# Patient Record
Sex: MALE | Race: WHITE | NOT HISPANIC OR LATINO | Employment: STUDENT | URBAN - METROPOLITAN AREA
[De-identification: names, ages, dates, MRNs, and addresses within clinical notes are randomized per-mention and may not be internally consistent; named-entity substitution may affect disease eponyms.]

---
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
## 2021-04-06 ENCOUNTER — IMMUNIZATION (OUTPATIENT)
Dept: PRIMARY CARE CLINIC | Facility: CLINIC | Age: 21
End: 2021-04-06
Payer: MEDICAID

## 2021-04-06 DIAGNOSIS — Z23 NEED FOR VACCINATION: Primary | ICD-10-CM

## 2021-04-06 PROCEDURE — 0001A COVID-19, MRNA, LNP-S, PF, 30 MCG/0.3 ML DOSE VACCINE: CPT | Mod: CV19,S$GLB,, | Performed by: FAMILY MEDICINE

## 2021-04-06 PROCEDURE — 91300 COVID-19, MRNA, LNP-S, PF, 30 MCG/0.3 ML DOSE VACCINE: CPT | Mod: S$GLB,,, | Performed by: FAMILY MEDICINE

## 2021-04-06 PROCEDURE — 91300 COVID-19, MRNA, LNP-S, PF, 30 MCG/0.3 ML DOSE VACCINE: ICD-10-PCS | Mod: S$GLB,,, | Performed by: FAMILY MEDICINE

## 2021-04-06 PROCEDURE — 0001A COVID-19, MRNA, LNP-S, PF, 30 MCG/0.3 ML DOSE VACCINE: ICD-10-PCS | Mod: CV19,S$GLB,, | Performed by: FAMILY MEDICINE

## 2021-04-27 ENCOUNTER — IMMUNIZATION (OUTPATIENT)
Dept: PRIMARY CARE CLINIC | Facility: CLINIC | Age: 21
End: 2021-04-27
Payer: MEDICAID

## 2021-04-27 DIAGNOSIS — Z23 NEED FOR VACCINATION: Primary | ICD-10-CM

## 2021-04-27 PROCEDURE — 91300 COVID-19, MRNA, LNP-S, PF, 30 MCG/0.3 ML DOSE VACCINE: CPT | Mod: S$GLB,,, | Performed by: FAMILY MEDICINE

## 2021-04-27 PROCEDURE — 0002A COVID-19, MRNA, LNP-S, PF, 30 MCG/0.3 ML DOSE VACCINE: ICD-10-PCS | Mod: CV19,S$GLB,, | Performed by: FAMILY MEDICINE

## 2021-04-27 PROCEDURE — 91300 COVID-19, MRNA, LNP-S, PF, 30 MCG/0.3 ML DOSE VACCINE: ICD-10-PCS | Mod: S$GLB,,, | Performed by: FAMILY MEDICINE

## 2021-04-27 PROCEDURE — 0002A COVID-19, MRNA, LNP-S, PF, 30 MCG/0.3 ML DOSE VACCINE: CPT | Mod: CV19,S$GLB,, | Performed by: FAMILY MEDICINE

## 2021-07-15 ENCOUNTER — PATIENT MESSAGE (OUTPATIENT)
Dept: PRIMARY CARE CLINIC | Facility: CLINIC | Age: 21
End: 2021-07-15

## 2021-07-15 DIAGNOSIS — L60.0 INGROWN TOENAIL: Primary | ICD-10-CM

## 2021-07-22 ENCOUNTER — OFFICE VISIT (OUTPATIENT)
Dept: PODIATRY | Facility: CLINIC | Age: 21
End: 2021-07-22
Payer: MEDICAID

## 2021-07-22 VITALS — HEIGHT: 69 IN | WEIGHT: 224 LBS | OXYGEN SATURATION: 98 % | HEART RATE: 92 BPM | BODY MASS INDEX: 33.18 KG/M2

## 2021-07-22 DIAGNOSIS — M79.675 TOE PAIN, LEFT: ICD-10-CM

## 2021-07-22 DIAGNOSIS — L60.0 INGROWN TOENAIL: ICD-10-CM

## 2021-07-22 DIAGNOSIS — L60.0 INGROWN NAIL: Primary | ICD-10-CM

## 2021-07-22 PROCEDURE — 11750 EXCISION NAIL&NAIL MATRIX: CPT | Mod: TA,S$GLB,, | Performed by: PODIATRIST

## 2021-07-22 PROCEDURE — 99203 PR OFFICE/OUTPT VISIT, NEW, LEVL III, 30-44 MIN: ICD-10-PCS | Mod: 25,S$GLB,, | Performed by: PODIATRIST

## 2021-07-22 PROCEDURE — 11750 NAIL REMOVAL: ICD-10-PCS | Mod: TA,S$GLB,, | Performed by: PODIATRIST

## 2021-07-22 PROCEDURE — 99203 OFFICE O/P NEW LOW 30 MIN: CPT | Mod: 25,S$GLB,, | Performed by: PODIATRIST

## 2021-09-17 DIAGNOSIS — J45.20 MILD INTERMITTENT REACTIVE AIRWAY DISEASE WITHOUT COMPLICATION: ICD-10-CM

## 2021-09-17 DIAGNOSIS — K52.9 GASTROENTERITIS: ICD-10-CM

## 2021-09-17 RX ORDER — OMEPRAZOLE 40 MG/1
40 CAPSULE, DELAYED RELEASE ORAL DAILY
Qty: 30 CAPSULE | Refills: 1 | Status: SHIPPED | OUTPATIENT
Start: 2021-09-17 | End: 2021-11-17 | Stop reason: SDUPTHER

## 2021-09-17 RX ORDER — ALBUTEROL SULFATE 90 UG/1
2 AEROSOL, METERED RESPIRATORY (INHALATION) EVERY 4 HOURS PRN
Qty: 18 G | Refills: 1 | Status: SHIPPED | OUTPATIENT
Start: 2021-09-17 | End: 2022-09-12 | Stop reason: SDUPTHER

## 2021-09-27 ENCOUNTER — PATIENT MESSAGE (OUTPATIENT)
Dept: PRIMARY CARE CLINIC | Facility: CLINIC | Age: 21
End: 2021-09-27

## 2021-09-28 RX ORDER — CETIRIZINE HYDROCHLORIDE 10 MG/1
TABLET ORAL
Qty: 30 TABLET | Refills: 5 | Status: SHIPPED | OUTPATIENT
Start: 2021-09-28 | End: 2021-11-17 | Stop reason: SDUPTHER

## 2021-09-28 RX ORDER — FLUTICASONE PROPIONATE 50 MCG
SPRAY, SUSPENSION (ML) NASAL
Qty: 16 G | Refills: 5 | Status: SHIPPED | OUTPATIENT
Start: 2021-09-28 | End: 2022-02-10 | Stop reason: SDUPTHER

## 2021-11-17 DIAGNOSIS — K52.9 GASTROENTERITIS: ICD-10-CM

## 2021-11-17 RX ORDER — CETIRIZINE HYDROCHLORIDE 10 MG/1
TABLET ORAL
Qty: 30 TABLET | Refills: 0 | Status: SHIPPED | OUTPATIENT
Start: 2021-11-17 | End: 2022-02-10 | Stop reason: SDUPTHER

## 2021-11-17 RX ORDER — OMEPRAZOLE 40 MG/1
40 CAPSULE, DELAYED RELEASE ORAL DAILY
Qty: 30 CAPSULE | Refills: 0 | Status: SHIPPED | OUTPATIENT
Start: 2021-11-17 | End: 2022-02-10 | Stop reason: SDUPTHER

## 2021-12-09 DIAGNOSIS — K52.9 GASTROENTERITIS: ICD-10-CM

## 2021-12-10 RX ORDER — OMEPRAZOLE 40 MG/1
CAPSULE, DELAYED RELEASE ORAL
Qty: 30 CAPSULE | Refills: 0 | OUTPATIENT
Start: 2021-12-10

## 2021-12-30 DIAGNOSIS — K52.9 GASTROENTERITIS: ICD-10-CM

## 2021-12-30 NOTE — TELEPHONE ENCOUNTER
----- Message from Brenda Benedict sent at 12/30/2021  4:45 PM CST -----  Contact: [phone] [employer]  Requesting an RX refill or new RX.  Is this a refill or new RX: refill  RX name and strength (copy/paste from chart): omeprazole (PRILOSEC) 40 MG capsule  Is this a 30 day or 90 day RX:   Patient advised that in the future they can use their [facility] account to request a refill?:  n/a  Pharmacy name and phone # (copy/paste from chart):  [email or web address] [place] - [address]  [address] [place] [address]  Phone: [phone] Fax: [phone]  Hours: Not open 24 hours      Comments:

## 2021-12-31 ENCOUNTER — PATIENT MESSAGE (OUTPATIENT)
Dept: PRIMARY CARE CLINIC | Facility: CLINIC | Age: 21
End: 2021-12-31
Payer: MEDICAID

## 2021-12-31 RX ORDER — OMEPRAZOLE 40 MG/1
40 CAPSULE, DELAYED RELEASE ORAL DAILY
Qty: 30 CAPSULE | Refills: 0 | OUTPATIENT
Start: 2021-12-31 | End: 2022-12-31

## 2022-02-10 ENCOUNTER — OFFICE VISIT (OUTPATIENT)
Dept: PRIMARY CARE CLINIC | Facility: CLINIC | Age: 22
End: 2022-02-10
Payer: MEDICAID

## 2022-02-10 VITALS
HEART RATE: 100 BPM | BODY MASS INDEX: 34.45 KG/M2 | OXYGEN SATURATION: 99 % | WEIGHT: 227.31 LBS | DIASTOLIC BLOOD PRESSURE: 92 MMHG | HEIGHT: 68 IN | SYSTOLIC BLOOD PRESSURE: 130 MMHG | RESPIRATION RATE: 16 BRPM

## 2022-02-10 DIAGNOSIS — Z11.59 NEED FOR HEPATITIS C SCREENING TEST: ICD-10-CM

## 2022-02-10 DIAGNOSIS — Z13.6 ENCOUNTER FOR LIPID SCREENING FOR CARDIOVASCULAR DISEASE: ICD-10-CM

## 2022-02-10 DIAGNOSIS — J01.90 ACUTE NON-RECURRENT SINUSITIS, UNSPECIFIED LOCATION: ICD-10-CM

## 2022-02-10 DIAGNOSIS — F90.9 ATTENTION DEFICIT HYPERACTIVITY DISORDER (ADHD), UNSPECIFIED ADHD TYPE: Primary | ICD-10-CM

## 2022-02-10 DIAGNOSIS — Z13.220 ENCOUNTER FOR LIPID SCREENING FOR CARDIOVASCULAR DISEASE: ICD-10-CM

## 2022-02-10 DIAGNOSIS — Z23 NEED FOR VACCINATION: ICD-10-CM

## 2022-02-10 DIAGNOSIS — J30.89 NON-SEASONAL ALLERGIC RHINITIS, UNSPECIFIED TRIGGER: ICD-10-CM

## 2022-02-10 PROCEDURE — 99214 OFFICE O/P EST MOD 30 MIN: CPT | Mod: PBBFAC,PN | Performed by: INTERNAL MEDICINE

## 2022-02-10 PROCEDURE — 1159F PR MEDICATION LIST DOCUMENTED IN MEDICAL RECORD: ICD-10-PCS | Mod: CPTII,,, | Performed by: INTERNAL MEDICINE

## 2022-02-10 PROCEDURE — 99214 PR OFFICE/OUTPT VISIT, EST, LEVL IV, 30-39 MIN: ICD-10-PCS | Mod: S$PBB,,, | Performed by: INTERNAL MEDICINE

## 2022-02-10 PROCEDURE — 3080F PR MOST RECENT DIASTOLIC BLOOD PRESSURE >= 90 MM HG: ICD-10-PCS | Mod: CPTII,,, | Performed by: INTERNAL MEDICINE

## 2022-02-10 PROCEDURE — 3080F DIAST BP >= 90 MM HG: CPT | Mod: CPTII,,, | Performed by: INTERNAL MEDICINE

## 2022-02-10 PROCEDURE — 90471 IMMUNIZATION ADMIN: CPT | Mod: PBBFAC,PN

## 2022-02-10 PROCEDURE — 1160F RVW MEDS BY RX/DR IN RCRD: CPT | Mod: CPTII,,, | Performed by: INTERNAL MEDICINE

## 2022-02-10 PROCEDURE — 99999 PR PBB SHADOW E&M-EST. PATIENT-LVL IV: CPT | Mod: PBBFAC,,, | Performed by: INTERNAL MEDICINE

## 2022-02-10 PROCEDURE — 3008F PR BODY MASS INDEX (BMI) DOCUMENTED: ICD-10-PCS | Mod: CPTII,,, | Performed by: INTERNAL MEDICINE

## 2022-02-10 PROCEDURE — 3008F BODY MASS INDEX DOCD: CPT | Mod: CPTII,,, | Performed by: INTERNAL MEDICINE

## 2022-02-10 PROCEDURE — 99999 PR PBB SHADOW E&M-EST. PATIENT-LVL IV: ICD-10-PCS | Mod: PBBFAC,,, | Performed by: INTERNAL MEDICINE

## 2022-02-10 PROCEDURE — 99214 OFFICE O/P EST MOD 30 MIN: CPT | Mod: S$PBB,,, | Performed by: INTERNAL MEDICINE

## 2022-02-10 PROCEDURE — 1159F MED LIST DOCD IN RCRD: CPT | Mod: CPTII,,, | Performed by: INTERNAL MEDICINE

## 2022-02-10 PROCEDURE — 3075F SYST BP GE 130 - 139MM HG: CPT | Mod: CPTII,,, | Performed by: INTERNAL MEDICINE

## 2022-02-10 PROCEDURE — 3075F PR MOST RECENT SYSTOLIC BLOOD PRESS GE 130-139MM HG: ICD-10-PCS | Mod: CPTII,,, | Performed by: INTERNAL MEDICINE

## 2022-02-10 PROCEDURE — 1160F PR REVIEW ALL MEDS BY PRESCRIBER/CLIN PHARMACIST DOCUMENTED: ICD-10-PCS | Mod: CPTII,,, | Performed by: INTERNAL MEDICINE

## 2022-02-10 RX ORDER — DEXAMETHASONE 4 MG/1
4 TABLET ORAL EVERY 12 HOURS
Qty: 14 TABLET | Refills: 0 | Status: SHIPPED | OUTPATIENT
Start: 2022-02-10 | End: 2022-07-28

## 2022-02-10 RX ORDER — AMOXICILLIN AND CLAVULANATE POTASSIUM 875; 125 MG/1; MG/1
TABLET, FILM COATED ORAL
COMMUNITY
Start: 2022-02-06 | End: 2022-06-22

## 2022-02-10 RX ORDER — PREDNISONE 10 MG/1
10 TABLET ORAL 2 TIMES DAILY
COMMUNITY
Start: 2022-02-06 | End: 2022-02-10

## 2022-02-10 RX ORDER — CETIRIZINE HYDROCHLORIDE 10 MG/1
TABLET ORAL
Qty: 30 TABLET | Refills: 2 | Status: SHIPPED | OUTPATIENT
Start: 2022-02-10 | End: 2022-03-12 | Stop reason: SDUPTHER

## 2022-02-10 RX ORDER — FLUTICASONE PROPIONATE 50 MCG
SPRAY, SUSPENSION (ML) NASAL
Qty: 16 G | Refills: 5 | Status: SHIPPED | OUTPATIENT
Start: 2022-02-10 | End: 2022-06-01 | Stop reason: SDUPTHER

## 2022-02-10 NOTE — PROGRESS NOTES
Verified pt ID using name and . Allergies verified with pt. Administered Flu Quad IM in Left deltoid per physician order using aseptic technique. Aspirated and no blood return noted. Pt tolerated well with no adverse reactions noted.

## 2022-02-10 NOTE — PROGRESS NOTES
Subjective:       Patient ID: Erasto Wharton is a 22 y.o. male.    Chief Complaint: Sinusitis (Went to Urgent Care in [place] - prescribed amoxicillin and prednisone ) and Facial Swelling (And lips )    HPI  Pt viist today with c/o toothache first went to dentist no infection prob gum ds pt then developes lip swelling nose sores pt went to ER given prednisone and amoxicillin he also report seasional allergy pt had covid vaccine x 3  Review of Systems    Objective:      Physical Exam  Vitals and nursing note reviewed.   Constitutional:       General: He is not in acute distress.     Appearance: He is well-developed and well-nourished.   HENT:      Head: Normocephalic and atraumatic.      Right Ear: External ear normal.      Left Ear: External ear normal.      Nose: Nose normal.      Mouth/Throat:      Mouth: Oropharynx is clear and moist.      Pharynx: No oropharyngeal exudate.   Eyes:      Extraocular Movements: Extraocular movements intact and EOM normal.      Conjunctiva/sclera: Conjunctivae normal.      Pupils: Pupils are equal, round, and reactive to light.   Neck:      Thyroid: No thyromegaly.   Cardiovascular:      Rate and Rhythm: Normal rate and regular rhythm.      Pulses: Intact distal pulses.      Heart sounds: Normal heart sounds. No murmur heard.  No friction rub. No gallop.    Pulmonary:      Effort: Pulmonary effort is normal. No respiratory distress.      Breath sounds: Normal breath sounds. No wheezing or rales.   Abdominal:      General: Bowel sounds are normal. There is no distension.      Palpations: Abdomen is soft.      Tenderness: There is no abdominal tenderness. There is no guarding.   Musculoskeletal:         General: No tenderness, deformity or edema. Normal range of motion.      Cervical back: Normal range of motion and neck supple.   Lymphadenopathy:      Cervical: No cervical adenopathy.   Skin:     General: Skin is warm and dry.      Findings: No erythema or rash.   Neurological:       General: No focal deficit present.      Mental Status: He is alert and oriented to person, place, and time.   Psychiatric:         Mood and Affect: Mood and affect and mood normal.         Thought Content: Thought content normal.         Judgment: Judgment normal.         Assessment:       1. Attention deficit hyperactivity disorder (ADHD), unspecified ADHD type    2. Need for vaccination    3. Encounter for lipid screening for cardiovascular disease    4. Need for hepatitis C screening test    5. Acute non-recurrent sinusitis, unspecified location    6. Non-seasonal allergic rhinitis, unspecified trigger        Plan:       Attention deficit hyperactivity disorder (ADHD), unspecified ADHD type    Need for vaccination  -     Influenza - Quadrivalent *Preferred* (6 months+) (PF)    Encounter for lipid screening for cardiovascular disease  -     Lipid Panel; Future; Expected date: 02/10/2022    Need for hepatitis C screening test  -     HIV 1/2 Ag/Ab (4th Gen); Future; Expected date: 02/10/2022  -     Hepatitis C Antibody; Future; Expected date: 02/10/2022    Acute non-recurrent sinusitis, unspecified location  -     dexAMETHasone (DECADRON) 4 MG Tab; Take 1 tablet (4 mg total) by mouth every 12 (twelve) hours.  Dispense: 14 tablet; Refill: 0    Non-seasonal allergic rhinitis, unspecified trigger  -     fluticasone propionate (FLONASE) 50 mcg/actuation nasal spray; fluticasone propionate 50 mcg/actuation nasal spray,suspension  Dispense: 16 g; Refill: 5  -     cetirizine (ZYRTEC) 10 MG tablet; cetirizine 10 mg tablet  Dispense: 30 tablet; Refill: 2  -     dexAMETHasone (DECADRON) 4 MG Tab; Take 1 tablet (4 mg total) by mouth every 12 (twelve) hours.  Dispense: 14 tablet; Refill: 0        Medication List with Changes/Refills   New Medications    DEXAMETHASONE (DECADRON) 4 MG TAB    Take 1 tablet (4 mg total) by mouth every 12 (twelve) hours.   Current Medications    ALBUTEROL (PROVENTIL/VENTOLIN HFA) 90 MCG/ACTUATION  INHALER    Inhale 2 puffs into the lungs every 4 (four) hours as needed for Shortness of Breath. Rescue    AMOXICILLIN-CLAVULANATE 875-125MG (AUGMENTIN) 875-125 MG PER TABLET    SMARTSI Tablet(s) By Mouth Every 12 Hours    CLINDAMYCIN (CLEOCIN T) 1 % LOTION    clindamycin 1 % lotion   APPLY A THIN LAYER TO THE AFFECTED AREA(S) BY TOPICAL ROUTE 2 TIMES PER DAY    IBUPROFEN (ADVIL,MOTRIN) 600 MG TABLET    Take 600 mg by mouth every 6 to 8 hours as needed.    METHYLPHENIDATE HCL 54 MG CR TABLET    Take 1 tablet (54 mg total) by mouth every morning.    MINOCYCLINE (MINOCIN,DYNACIN) 50 MG CAP    minocycline 50 mg capsule    OMEPRAZOLE (PRILOSEC) 40 MG CAPSULE    Take 1 capsule (40 mg total) by mouth once daily.    ONDANSETRON (ZOFRAN) 4 MG TABLET    Take 1 tablet (4 mg total) by mouth 2 (two) times daily.   Changed and/or Refilled Medications    Modified Medication Previous Medication    CETIRIZINE (ZYRTEC) 10 MG TABLET cetirizine (ZYRTEC) 10 MG tablet       cetirizine 10 mg tablet    cetirizine 10 mg tablet    FLUTICASONE PROPIONATE (FLONASE) 50 MCG/ACTUATION NASAL SPRAY fluticasone propionate (FLONASE) 50 mcg/actuation nasal spray       fluticasone propionate 50 mcg/actuation nasal spray,suspension    fluticasone propionate 50 mcg/actuation nasal spray,suspension   Discontinued Medications    PREDNISONE (DELTASONE) 10 MG TABLET    Take 10 mg by mouth 2 (two) times daily.

## 2022-02-15 ENCOUNTER — OFFICE VISIT (OUTPATIENT)
Dept: PODIATRY | Facility: CLINIC | Age: 22
End: 2022-02-15
Payer: MEDICAID

## 2022-02-15 DIAGNOSIS — L60.0 INGROWN TOENAIL: Primary | ICD-10-CM

## 2022-02-15 PROCEDURE — 99213 OFFICE O/P EST LOW 20 MIN: CPT | Mod: 25,S$GLB,, | Performed by: PODIATRIST

## 2022-02-15 PROCEDURE — 1159F PR MEDICATION LIST DOCUMENTED IN MEDICAL RECORD: ICD-10-PCS | Mod: CPTII,S$GLB,, | Performed by: PODIATRIST

## 2022-02-15 PROCEDURE — 99213 PR OFFICE/OUTPT VISIT, EST, LEVL III, 20-29 MIN: ICD-10-PCS | Mod: 25,S$GLB,, | Performed by: PODIATRIST

## 2022-02-15 PROCEDURE — 11750 NAIL REMOVAL: ICD-10-PCS | Mod: T5,S$GLB,, | Performed by: PODIATRIST

## 2022-02-15 PROCEDURE — 1160F PR REVIEW ALL MEDS BY PRESCRIBER/CLIN PHARMACIST DOCUMENTED: ICD-10-PCS | Mod: CPTII,S$GLB,, | Performed by: PODIATRIST

## 2022-02-15 PROCEDURE — 11750 EXCISION NAIL&NAIL MATRIX: CPT | Mod: T5,S$GLB,, | Performed by: PODIATRIST

## 2022-02-15 PROCEDURE — 1160F RVW MEDS BY RX/DR IN RCRD: CPT | Mod: CPTII,S$GLB,, | Performed by: PODIATRIST

## 2022-02-15 PROCEDURE — 1159F MED LIST DOCD IN RCRD: CPT | Mod: CPTII,S$GLB,, | Performed by: PODIATRIST

## 2022-02-15 NOTE — PROGRESS NOTES
[address] [facility] [address]  [place] [address]  Phone: [phone]   [phone]    Patient's PCP:Clinton Card MD  Referring Provider: No ref. provider found    Subjective:      Chief Complaint:: Ingrown Toenail (Right great toe lateral border)    BETSY Wharton is a 22 y.o. male who presents with a complaint of ingrown lateral border right great toenail lasting for two weeks. Onset of symptoms swelling and reports no trauma.  Current symptoms include swelling and inflammation.  Aggravating factors are shoe. Symptoms have remained. Treatment to date have included home trimming.      There were no vitals filed for this visit.   Shoe Size: 10    Past Surgical History:   Procedure Laterality Date    CHOLECYSTECTOMY      LAPAROSCOPIC CHOLECYSTECTOMY  08/27/2020    LAPAROSCOPIC CHOLECYSTECTOMY  8/27/2020    Procedure: CHOLECYSTECTOMY, LAPAROSCOPIC;  Surgeon: Gian Peterson MD;  Location: [facility];  Service: General;;  [employer] video confirmed 8/21/dme    NASAL SINUS SURGERY  2003    Battery removed from nose    WISDOM TOOTH EXTRACTION       History reviewed. No pertinent past medical history.  Family History   Problem Relation Age of Onset    Hypertension Mother     Heart disease Father     Lung disease Father     Asthma Father         Social History:   Marital Status: Single  Alcohol History:  reports current alcohol use.  Tobacco History:  reports that he has never smoked. He has never used smokeless tobacco.  Drug History:  reports no history of drug use.    Review of patient's allergies indicates:   Allergen Reactions    Benzoyl peroxide Dermatitis, Hives, Itching, Rash and Swelling       Current Outpatient Medications   Medication Sig Dispense Refill    albuterol (PROVENTIL/VENTOLIN HFA) 90 mcg/actuation inhaler Inhale 2 puffs into the lungs every 4 (four) hours as needed for Shortness of Breath. Rescue 18 g 1    amoxicillin-clavulanate 875-125mg (AUGMENTIN) 875-125 mg per tablet  SMARTSI Tablet(s) By Mouth Every 12 Hours      cetirizine (ZYRTEC) 10 MG tablet cetirizine 10 mg tablet 30 tablet 2    clindamycin (CLEOCIN T) 1 % lotion clindamycin 1 % lotion   APPLY A THIN LAYER TO THE AFFECTED AREA(S) BY TOPICAL ROUTE 2 TIMES PER DAY      dexAMETHasone (DECADRON) 4 MG Tab Take 1 tablet (4 mg total) by mouth every 12 (twelve) hours. 14 tablet 0    fluticasone propionate (FLONASE) 50 mcg/actuation nasal spray fluticasone propionate 50 mcg/actuation nasal spray,suspension 16 g 5    ibuprofen (ADVIL,MOTRIN) 600 MG tablet Take 600 mg by mouth every 6 to 8 hours as needed.      methylphenidate HCl 54 MG CR tablet Take 1 tablet (54 mg total) by mouth every morning. 30 tablet 0    minocycline (MINOCIN,DYNACIN) 50 MG Cap minocycline 50 mg capsule      omeprazole (PRILOSEC) 40 MG capsule Take 1 capsule (40 mg total) by mouth once daily. 30 capsule 0    ondansetron (ZOFRAN) 4 MG tablet Take 1 tablet (4 mg total) by mouth 2 (two) times daily. 12 tablet 0     No current facility-administered medications for this visit.       Review of Systems   Constitutional: Negative for chills, fatigue, fever and unexpected weight change.   HENT: Negative for hearing loss and trouble swallowing.    Eyes: Negative for photophobia and visual disturbance.   Respiratory: Negative for cough, shortness of breath and wheezing.    Cardiovascular: Negative for chest pain, palpitations and leg swelling.   Gastrointestinal: Negative for abdominal pain and nausea.   Genitourinary: Negative for dysuria and frequency.   Musculoskeletal: Negative for arthralgias, back pain, gait problem, joint swelling and myalgias.   Skin: Positive for color change. Negative for rash and wound.   Neurological: Negative for tremors, seizures, weakness, numbness and headaches.   Hematological: Does not bruise/bleed easily.         Objective:        Physical Exam:   Foot Exam    General  General Appearance: appears stated age and healthy    Orientation: alert and oriented to person, place, and time   Affect: appropriate   Gait: unimpaired       Right Foot/Ankle     Inspection and Palpation  Skin Exam: (Pain lateral border 1st toenail with ingrown nail but no purulent drainage)  Neurovascular  Dorsalis pedis: 2+  Posterior tibial: 2+  Capillary Refill: 2+  Saphenous nerve sensation: normal  Tibial nerve sensation: normal  Superficial peroneal nerve sensation: normal  Deep peroneal nerve sensation: normal  Sural nerve sensation: normal          Physical Exam  Cardiovascular:      Pulses:           Dorsalis pedis pulses are 2+ on the right side.        Posterior tibial pulses are 2+ on the right side.   Musculoskeletal:        Feet:          Imaging:            Assessment:       1. Ingrown toenail - Right Foot      Plan:   Ingrown toenail - Right Foot    Ingrown toenail treatment options of no treatment, avulsion of nail border under local with regrowth of nail, chemical matrixectomy for attempted permanent correction of the problem. Patient was educated about daily dressing changes, soaks, and medications following removal of the nail.  Patient decided on chemical matricectomy lateral border right 1st toe nail  Return as needed    Nail Removal    Date/Time: 2/15/2022 3:20 PM  Performed by: David Gilmore DPM  Authorized by: David Gilmore DPM     Consent Done?:  Yes (Written)  Location:     Location:  Right foot    Location detail:  Right big toe  Anesthesia:     Anesthesia:  Digital block    Local anesthetic:  Lidocaine 2% without epinephrine    Anesthetic total (ml):  4  Procedure Details:     Preparation:  Skin prepped with alcohol    Amount removed:  Partial    Side:  Lateral    Wedge excision of skin of nail fold: No      Nail bed sutured?: No      Nail matrix removed:  Partial    Removal method:  Phenol and alcohol    Removed nail replaced and anchored: No      Dressing applied:  4x4 and gauze roll    Patient tolerance:  Patient  tolerated the procedure well with no immediate complications     Daily dressing changes with Neosporin and tube gauze until dry.  Patient return to see me as needed.              Counseling:     I provided patient education verbally regarding:   Patient diagnosis, treatment options, as well as alternatives, risks, and benefits.     This note was created using Dragon voice recognition software that occasionally misinterpreted phrases or words.

## 2022-02-15 NOTE — PATIENT INSTRUCTIONS
Understanding Ingrown Toenails    An ingrown nail is the result of a nail growing into the skin that surrounds it. This often occurs at either edge of the big toe. Ingrown nails may be caused by improper trimming, inherited nail deformities, injuries, fungal infections, or pressure.    Symptoms  Ingrown nails may cause pain at the tip of the toe or all the way to the base of the toe. The pain is often worse while walking. An ingrown nail may also lead to infection, inflammation, or a more serious condition. If its infected, you might see pus or redness.    Evaluation  To determine the extent of your problem, your healthcare provider examines and possibly presses the painful area. If other problems are suspected, blood tests, cultures, and X-rays may be done as well.    Treatment  If the nail isnt infected, your healthcare provider may trim the corner of it to help relieve your symptoms. He or she may need to remove one side of your nail back to the cuticle. The base of the nail may then be treated with a chemical to keep the ingrown part from growing back. Severe infections or ingrown nails may require antibiotics and temporary or permanent removal of a portion of the nail. To prevent pain, a local anesthetic may be used in these procedures. This treatment is usually done at your healthcare provider's office.    Prevention  Many nail problems can be prevented by wearing the right shoes and trimming your nails properly. To help avoid infection, keep your feet clean and dry. If you have diabetes, talk with your healthcare provider before doing any foot self-care.  · The right shoes: Get your feet measured (your size may change as you age). Wear shoes that are supportive and roomy enough for your toes to wiggle. Look for shoes made of natural materials such as leather, which allow your feet to breathe.  · Proper trimming: To avoid problems, trim your toenails straight across without cutting down into the corners. If  you cant trim your own nails, ask your healthcare provider to do so for you.    Date Last Reviewed: 10/1/2016  © 9149-1604 [employer] [address] [place] [address], [place] [address]. All rights reserved. This information is not intended as a substitute for professional medical care. Always follow your healthcare professional's instructions.        INSTRUCTIONS FOLLOWING CORRECTIVE NAIL SURGERY TODAY     Go directly home and elevate foot.   Restrict your activities the remainder of the day.   Apply ice pack if needed.   Keep bandages clean and dry.   You may notice some oozing coming through the bandages; this is normal.  Do not remove the dressing, apply additional dressing on top should you need to.  If bandage becomes saturated with blood, call us.   Local anesthesia will last 3-6 hours.   For discomfort, take Advil, Tylenol or pain medication if prescribed.   If you were given antibiotics, take them until they are all gone. It is important to finish the antibiotics even if the wound looks better. This ensures that the infection clears.      TOMORROW     When you take your shower, get dressing saturated then remove the dressing so that the dressing does not pull or stick to the toe and bathe as normal.   Soak in 2 Tablespoons of Epsom Salt daily for 1 hour   Dry area.   Apply Neosporin and gauze bandage.  No Band-Aid   Re-bandage twice daily for two weeks until next appointment.   If any problems arise, call the office. We do have a 24 hours answering service.     If you had a procedure with phenol, it is normal for your toe to drain and have redness for 4-6 weeks.  Any redness or streaks going up the foot is NOT normal.     Your post-operative appointment in two weeks is not included in today's charges.  You will be expected to pay any co-payment or deductible.

## 2022-03-12 DIAGNOSIS — J30.89 NON-SEASONAL ALLERGIC RHINITIS, UNSPECIFIED TRIGGER: ICD-10-CM

## 2022-03-12 DIAGNOSIS — K52.9 GASTROENTERITIS: ICD-10-CM

## 2022-03-12 NOTE — TELEPHONE ENCOUNTER
No new care gaps identified.  Powered by [employer]. Reference number: [id number].   3/12/2022 2:56:32 PM CST

## 2022-03-19 DIAGNOSIS — K52.9 GASTROENTERITIS: ICD-10-CM

## 2022-03-19 NOTE — TELEPHONE ENCOUNTER
No new care gaps identified.  Powered by [employer]. Reference number: [id number].   3/19/2022 2:18:48 PM CDT

## 2022-03-21 RX ORDER — CETIRIZINE HYDROCHLORIDE 10 MG/1
TABLET ORAL
Qty: 90 TABLET | Refills: 3 | Status: SHIPPED | OUTPATIENT
Start: 2022-03-21 | End: 2022-06-01 | Stop reason: SDUPTHER

## 2022-03-21 NOTE — TELEPHONE ENCOUNTER
Refill Routing Note   Medication(s) are not appropriate for processing by [facility] for the following reason(s):      - Indication out of scope    [facility] action(s):  Defer  Approve       Medication Therapy Plan: Defer prilosec - DX OOS.  --->Care Gap information included in message below if applicable.   Medication reconciliation completed: No   Automatic Epic Generated Protocol Data:    Orders Placed This Encounter    cetirizine (ZYRTEC) 10 MG tablet      Requested Prescriptions   Pending Prescriptions Disp Refills    omeprazole (PRILOSEC) 40 MG capsule 90 capsule 3     Sig: Take 1 capsule (40 mg total) by mouth once daily.       Gastroenterology: Proton Pump Inhibitors Failed - 3/21/2022  3:29 PM        Failed - An appropriate indication is on the problem list        Passed - Patient is at least 18 years old        Passed - Osteoporosis is not on problem list        Passed - Plavix is not on active medication list        Passed - Valid encounter within last 15 months     Recent Visits  Date Type Provider Dept   02/10/22 Office Visit Clinton Card MD [facility]   Showing recent visits within past 720 days and meeting all other requirements  Future Appointments  No visits were found meeting these conditions.  Showing future appointments within next 150 days and meeting all other requirements                 Signed Prescriptions Disp Refills    cetirizine (ZYRTEC) 10 MG tablet 90 tablet 3     Sig: Take 1 tablet by mouth daily.       Ear, Nose, and Throat:  Antihistamines Passed - 3/21/2022  3:29 PM        Passed - Patient is at least 18 years old        Passed - Valid encounter within last 15 months     Recent Visits  Date Type Provider Dept   02/10/22 Office Visit Clinton Card MD [facility]   Showing recent visits within past 720 days and meeting all other requirements  Future Appointments  No visits were found meeting these conditions.  Showing future appointments within  next 150 days and meeting all other requirements                      Appointments  past 12m or future 3m with PCP    Date Provider   Last Visit   2/10/2022 Clinton Card MD   Next Visit   3/19/2022 Clinton Card MD   ED visits in past 90 days: 0        Note composed:3:33 PM 03/21/2022

## 2022-03-22 RX ORDER — OMEPRAZOLE 40 MG/1
40 CAPSULE, DELAYED RELEASE ORAL DAILY
Qty: 90 CAPSULE | Refills: 3 | Status: SHIPPED | OUTPATIENT
Start: 2022-03-22 | End: 2022-09-12

## 2022-03-22 RX ORDER — OMEPRAZOLE 40 MG/1
40 CAPSULE, DELAYED RELEASE ORAL DAILY
Qty: 90 CAPSULE | Refills: 3 | Status: SHIPPED | OUTPATIENT
Start: 2022-03-22 | End: 2022-04-17 | Stop reason: SDUPTHER

## 2022-04-17 DIAGNOSIS — K52.9 GASTROENTERITIS: ICD-10-CM

## 2022-04-17 NOTE — TELEPHONE ENCOUNTER
No new care gaps identified.  Powered by [employer]. Reference number: [id number].   4/17/2022 8:45:09 AM CDT

## 2022-04-19 RX ORDER — OMEPRAZOLE 40 MG/1
40 CAPSULE, DELAYED RELEASE ORAL DAILY
Qty: 90 CAPSULE | Refills: 0 | Status: SHIPPED | OUTPATIENT
Start: 2022-04-19 | End: 2022-09-12

## 2022-06-01 DIAGNOSIS — J30.89 NON-SEASONAL ALLERGIC RHINITIS, UNSPECIFIED TRIGGER: ICD-10-CM

## 2022-06-01 NOTE — TELEPHONE ENCOUNTER
No new care gaps identified.  [facility] Embedded Care Gaps. Reference number: [id number]. 6/01/2022   11:18:02 AM LONNIET

## 2022-06-02 RX ORDER — CETIRIZINE HYDROCHLORIDE 10 MG/1
TABLET ORAL
Qty: 90 TABLET | Refills: 0 | Status: SHIPPED | OUTPATIENT
Start: 2022-06-02 | End: 2022-11-06 | Stop reason: SDUPTHER

## 2022-06-02 RX ORDER — FLUTICASONE PROPIONATE 50 MCG
SPRAY, SUSPENSION (ML) NASAL
Qty: 16 G | Refills: 0 | Status: SHIPPED | OUTPATIENT
Start: 2022-06-02 | End: 2022-07-22

## 2022-06-20 ENCOUNTER — PATIENT MESSAGE (OUTPATIENT)
Dept: PRIMARY CARE CLINIC | Facility: CLINIC | Age: 22
End: 2022-06-20
Payer: MEDICAID

## 2022-06-21 ENCOUNTER — TELEPHONE (OUTPATIENT)
Dept: OTOLARYNGOLOGY | Facility: CLINIC | Age: 22
End: 2022-06-21
Payer: MEDICAID

## 2022-06-21 NOTE — TELEPHONE ENCOUNTER
Contacted and spoke w/pt after receiving call back msg. Was able to schedule pt for tomorrow morning @ 1115.      ----- Message from Brittany  sent at 6/21/2022  9:13 AM CDT -----  Regarding: appt access  Type: Needs Medical Advice    Who Called:  mom    Best Call Back Number:     Additional Information: Requesting a call back from Nurse, regarding pt has a referral on file and mom wants him seen this week ,please advise and call back

## 2022-06-22 ENCOUNTER — OFFICE VISIT (OUTPATIENT)
Dept: OTOLARYNGOLOGY | Facility: CLINIC | Age: 22
End: 2022-06-22
Payer: MEDICAID

## 2022-06-22 ENCOUNTER — PATIENT MESSAGE (OUTPATIENT)
Dept: OTOLARYNGOLOGY | Facility: CLINIC | Age: 22
End: 2022-06-22

## 2022-06-22 ENCOUNTER — LAB VISIT (OUTPATIENT)
Dept: LAB | Facility: HOSPITAL | Age: 22
End: 2022-06-22
Attending: PHYSICIAN ASSISTANT
Payer: MEDICAID

## 2022-06-22 VITALS — BODY MASS INDEX: 34.51 KG/M2 | HEIGHT: 69 IN | TEMPERATURE: 98 F | WEIGHT: 233 LBS

## 2022-06-22 DIAGNOSIS — J01.91 ACUTE RECURRENT SINUSITIS, UNSPECIFIED LOCATION: Primary | ICD-10-CM

## 2022-06-22 DIAGNOSIS — Z91.09 ENVIRONMENTAL ALLERGIES: ICD-10-CM

## 2022-06-22 DIAGNOSIS — J34.89 RHINORRHEA: ICD-10-CM

## 2022-06-22 DIAGNOSIS — R09.81 NASAL CONGESTION: ICD-10-CM

## 2022-06-22 DIAGNOSIS — R09.82 POST-NASAL DRIP: ICD-10-CM

## 2022-06-22 PROCEDURE — 1160F RVW MEDS BY RX/DR IN RCRD: CPT | Mod: CPTII,,, | Performed by: PHYSICIAN ASSISTANT

## 2022-06-22 PROCEDURE — 3008F BODY MASS INDEX DOCD: CPT | Mod: CPTII,,, | Performed by: PHYSICIAN ASSISTANT

## 2022-06-22 PROCEDURE — 86003 ALLG SPEC IGE CRUDE XTRC EA: CPT | Mod: 59 | Performed by: PHYSICIAN ASSISTANT

## 2022-06-22 PROCEDURE — 36415 COLL VENOUS BLD VENIPUNCTURE: CPT | Performed by: PHYSICIAN ASSISTANT

## 2022-06-22 PROCEDURE — 99203 OFFICE O/P NEW LOW 30 MIN: CPT | Mod: S$PBB,,, | Performed by: PHYSICIAN ASSISTANT

## 2022-06-22 PROCEDURE — 99203 PR OFFICE/OUTPT VISIT, NEW, LEVL III, 30-44 MIN: ICD-10-PCS | Mod: S$PBB,,, | Performed by: PHYSICIAN ASSISTANT

## 2022-06-22 PROCEDURE — 3008F PR BODY MASS INDEX (BMI) DOCUMENTED: ICD-10-PCS | Mod: CPTII,,, | Performed by: PHYSICIAN ASSISTANT

## 2022-06-22 PROCEDURE — 1159F PR MEDICATION LIST DOCUMENTED IN MEDICAL RECORD: ICD-10-PCS | Mod: CPTII,,, | Performed by: PHYSICIAN ASSISTANT

## 2022-06-22 PROCEDURE — 1159F MED LIST DOCD IN RCRD: CPT | Mod: CPTII,,, | Performed by: PHYSICIAN ASSISTANT

## 2022-06-22 PROCEDURE — 86003 ALLG SPEC IGE CRUDE XTRC EA: CPT | Performed by: PHYSICIAN ASSISTANT

## 2022-06-22 PROCEDURE — 99999 PR PBB SHADOW E&M-EST. PATIENT-LVL IV: ICD-10-PCS | Mod: PBBFAC,,, | Performed by: PHYSICIAN ASSISTANT

## 2022-06-22 PROCEDURE — 99999 PR PBB SHADOW E&M-EST. PATIENT-LVL IV: CPT | Mod: PBBFAC,,, | Performed by: PHYSICIAN ASSISTANT

## 2022-06-22 PROCEDURE — 1160F PR REVIEW ALL MEDS BY PRESCRIBER/CLIN PHARMACIST DOCUMENTED: ICD-10-PCS | Mod: CPTII,,, | Performed by: PHYSICIAN ASSISTANT

## 2022-06-22 PROCEDURE — 99214 OFFICE O/P EST MOD 30 MIN: CPT | Mod: PBBFAC,PO | Performed by: PHYSICIAN ASSISTANT

## 2022-06-22 RX ORDER — AMOXICILLIN AND CLAVULANATE POTASSIUM 875; 125 MG/1; MG/1
1 TABLET, FILM COATED ORAL EVERY 12 HOURS
Qty: 20 TABLET | Refills: 0 | Status: SHIPPED | OUTPATIENT
Start: 2022-06-22 | End: 2022-07-02

## 2022-06-22 NOTE — PATIENT INSTRUCTIONS
Use NeilMed sinus rinse two times daily. After clearing out your nose, use flonase 1 puff to each nostril twice daily. Continue zyrtec daily.    Change from amoxicillin to augmentin. Take augmentin 875mg twice daily x 10 days.     Follow up in 2 weeks for sinus recheck and possible nasal endoscopy.          DIRECTIONS FOR SINUS SALINE RINSE To see demonstration: Enter http://www.idiag.com/watch?v=[email or web address] into the browser address box, or go to You tube, and under the search box, enter sinus rinse. Click on NeilMed Sinus Rinse Video    Step 1    Step 1 Please wash your hands. Fill the clean bottle with the designated volume of warm distilled water, filtered water or previously boiled water. You may warm the water in a microwave but we recommend that you warm it in increments of five seconds. This is to avoid excessive heating of the water and damage to the device or scalding your nasal passage.    Step 2    Step 2 Cut the SINUS RINSE mixture packet at the corner and pour its contents into the bottle. Tighten the cap & tube on the bottle securely, place one finger over the tip of the cap and shake the bottle gently to dissolve the mixture.      Step 3    Step 3 Standing in front of a sink, bend forward to your comfort level and tilt your head down. Keeping your mouth open without holding your breath, place cap snugly against your nasal passage and SQUEEZE BOTTLE GENTLY until the solution starts draining from the OPPOSITE nasal passage or from your mouth. Keep squeezing the bottle GENTLY until at least 1/4 to 1/2 (60 to 120 mL) of the bottle is used for a proper rinse. Do not swallow the solution.    Step 4    Blow your nose gently, without pinching your nose completely because this will apply pressure on the eardrums. If tolerable, sniff in any residual solution remaining in the nasal passage once or twice prior to blowing your nose as this may clean out the posterior nasopharyngeal area (the area at the  back of your nasal passage). Some solution will reach the back of the throat, so please spit it out. To help improve drainage of any residual solution, blow your nose gently while tilting your head to the opposite side of the nasal passage that you just rinsed.    Step 5    Now repeat steps 3 & 4 for your other nasal passage.    Step 6     Air dry the Sinus Rinse bottle, cap, and tube on a clean paper towel, a glass plate to store the bottle cap and tube. If there is any solution leftover, please discard it. We recommend you make a fresh solution each time you rinse. Rinse 5 times each day, OR as directed by your physician. Warnings: DO NOT RINSE IF NASAL PASSAGE IS COMPLETELY BLOCKED OR IF YOU HAVE AN EAR INFECTION OR BLOCKED EARS. If you have had ear surgery, please contact your physician prior to irrigation. If you experience any pressure in your ears, stop the rinse and get further directions from your physician or contact our office during regular business hours. To avoid any ear discomfort: Heat the solution to lukewarm, do not use hot, boiling or cold water. Keep your mouth open. Do not hold your breath and if possible make the sound KLAUS....KLAUS... Make sure to take the position as shown. Gently squeeze 1/4 of the bottle at a time (60mL / 2 ounces). Stop the rinse if you feel any solution sensation near the ears. You may rinse with a partially blocked nasal passage. Please do not use for any other purposes. Please rinse at least ONE HOUR PRIOR to bedtime, in order to avoid any residual solution dripping in the throat.    >> Before using the SINUS RINSE kit, please inspect the cap, tube and bottle carefully for wear and tear. If any of the components appear discolored or cracked, please contact [employer] to obtain a replacement. You must follow the cleaning instructions provided in this brochure or cleaning instruction card prior to each use.    >> The SINUS RINSE bottle and mixture are to be used only for  nasalirrigation. Do not use for any other purposes.    >> We recommend that you use the rinse ONE HOUR PRIOR to bedtime in order to avoid any residual solution dripping in the throat.    Tips to avoid ear discomfort while rinsing    If you have had ear surgery, please contact your physician prior to irrigation. Do not use if you have an ear infection or blocked ears. Rinse with lukewarm water. Keep your mouth open. Do not hold your breath while rinsing. While rinsing, make sure to tilt your. Gently squeeze the bottle while rinsing; do not squeeze the bottle very forcefully. Stop the rinse if you feel a sensation of fluid near your ears.    Tips to avoid unexpected drainage after rinsing    In rare situations, especially if you have had sinus surgery, the saline solution can pool in the sinus cavities and nasal passages and then drip from your nostrils hours after rinsing. To avoid this harmless but annoying inconvenience, take one extra step after rinsing: lean forward, tilt your head sideways and gently blow your nose. Then, tilt your head to the other side and blow again. You may need to repeat this several times. This will help rid your nasal passages of any excess mucus and remaining saline solution. If you find yourself experiencing delayed drainage often, do not rinse right before leaving your house or going to bed.

## 2022-06-22 NOTE — PROGRESS NOTES
[facility]    Subjective:      Patient: Erasto Wharton Patient PCP: Clinton Card MD         :  2000     Sex:  male      MRN:  [id number]          Date of Visit: 2022      Chief Complaint: Sinus issues    Patient ID: Erasto Wharton is a 22 y.o. male who was referred to me by Dr. Clinton Card in consultation for sinus infection. Pt began having sore throat 1 week ago with sinus congetsion that started 2 days later. [facility] he had [facility]. Pt states that he was treated steroid shot, amoxicillin, and oral prednisone. Pt states that influenza and covid19 [facility]. Pt states that throat symptoms were alleviated with treatment. Pt has not yet completed amoxicillin. Pt states that he has a throat tickle right now. Pt states he is having green rhinorrhea which is hard to get out he has had productive cough with green mucous. Pt had last episode of green productive cough morning. Pt states that he has had 2 sinus infecitons in the last month. Pt states that he is taking zyrtec daily and flonase 1 puff to each nostril once in them morning. Pt states that he has sinus congestion symptoms rather than true sinus pain/pressure. Pt denies fever/chills.     Pt states that he has issues with seasonal allergies. Pt states that his seasonal allergiees are worse during the summer/winter months. Pt denies history of allergy testing.     SNOT-22 score: : (P) 47  NOSE score:: (P) 55%  ETDQ-7 score:: (P) 1.3    Review of Systems   HENT: Positive for congestion, mouth sores, postnasal drip and sore throat (alleviated with abx and steroid shot and oral steroids. Pt states he just feels tickling sensation in throat now). Negative for sinus pressure and sinus pain.    Eyes: Negative.    Respiratory: Positive for cough.    Cardiovascular: Negative.    Gastrointestinal: Positive for diarrhea.   Endocrine: Negative.    Genitourinary: Negative.    Musculoskeletal:  Negative.    Skin: Negative.    Neurological: Positive for headaches.   Hematological: Negative.    Psychiatric/Behavioral: Negative.         Past Medical History  He has no past medical history on file.    Family History  His family history includes Asthma in his father; Heart disease in his father; Hypertension in his mother; Lung disease in his father.    Past Surgical History:   Procedure Laterality Date    CHOLECYSTECTOMY      LAPAROSCOPIC CHOLECYSTECTOMY  08/27/2020    LAPAROSCOPIC CHOLECYSTECTOMY  8/27/2020    Procedure: CHOLECYSTECTOMY, LAPAROSCOPIC;  Surgeon: Gian Peterson MD;  Location: [facility];  Service: General;;  [employer] video confirmed 8/21/dme    NASAL SINUS SURGERY  2003    Battery removed from nose    WISDOM TOOTH EXTRACTION       Social History     Tobacco Use    Smoking status: Never Smoker    Smokeless tobacco: Never Used   Substance and Sexual Activity    Alcohol use: Yes     Comment: occasionally     Drug use: Never    Sexual activity: Yes     Partners: Female     Birth control/protection: OCP     Medications  He has a current medication list which includes the following prescription(s): albuterol, amoxicillin-clavulanate 875-125mg, cetirizine, clindamycin, dexamethasone, fluticasone propionate, ibuprofen, methylphenidate hcl, minocycline, omeprazole, omeprazole, and ondansetron.    Review of patient's allergies indicates:   Allergen Reactions    Benzoyl peroxide Dermatitis, Hives, Itching, Rash and Swelling     All medications, allergies, and past history have been reviewed.    Objective:      Vitals:  Vitals - 1 value per visit 2/10/2022 6/22/2022 6/22/2022   SYSTOLIC 130 - -   DIASTOLIC 92 - -   Pulse 100 - -   Temp - - 98.4   Resp 16 - -   SPO2 99 - -   Weight (lb) 227.29 - 233.03   Weight (kg) 103.1 - 105.7   Height 68 - 69   BMI (Calculated) 34.6 - 34.4   VISIT REPORT - - -   Pain Score  - 3 -       Body surface area is 2.27 meters squared.  Physical  Exam  Constitutional:       General: He is not in acute distress.     Appearance: Normal appearance. He is not ill-appearing.   HENT:      Head: Normocephalic and atraumatic.      Right Ear: Tympanic membrane, ear canal and external ear normal.      Left Ear: Tympanic membrane, ear canal and external ear normal.      Nose: Congestion and rhinorrhea present.      Right Sinus: No maxillary sinus tenderness or frontal sinus tenderness.      Left Sinus: No maxillary sinus tenderness or frontal sinus tenderness.      Mouth/Throat:      Lips: Pink. No lesions.      Mouth: Mucous membranes are moist. No oral lesions.      Tongue: No lesions.      Palate: No lesions.      Pharynx: Oropharynx is clear. Uvula midline. No pharyngeal swelling, oropharyngeal exudate, posterior oropharyngeal erythema or uvula swelling.      Tonsils: No tonsillar exudate or tonsillar abscesses.      Comments: Omak tonsils between 1+-2+ bilaterally.   Eyes:      General:         Right eye: No discharge.         Left eye: No discharge.      Extraocular Movements: Extraocular movements intact.      Conjunctiva/sclera: Conjunctivae normal.   Pulmonary:      Effort: Pulmonary effort is normal.   Neurological:      General: No focal deficit present.      Mental Status: He is alert and oriented to person, place, and time. Mental status is at baseline.   Psychiatric:         Mood and Affect: Mood normal.         Behavior: Behavior normal.         Thought Content: Thought content normal.         Judgment: Judgment normal.       Labs:  WBC   Date Value Ref Range Status   07/30/2020 14.64 (H) 3.90 - 12.70 K/uL Final     Eosinophil %   Date Value Ref Range Status   07/30/2020 1.8 0.0 - 8.0 % Final     Eos #   Date Value Ref Range Status   07/30/2020 0.3 0.0 - 0.5 K/uL Final     Platelets   Date Value Ref Range Status   07/30/2020 282 150 - 350 K/uL Final     Glucose   Date Value Ref Range Status   07/30/2020 97 70 - 110 mg/dL Final     All lab results,  imaging results, and data have been reviewed.    Assessment:        ICD-10-CM ICD-9-CM   1. Acute recurrent sinusitis, unspecified location  J01.91 461.9   2. Environmental allergies  Z91.09 V15.09   3. Rhinorrhea  J34.89 478.19   4. Post-nasal drip  R09.82 784.91            Plan:      Acute recurrent sinusitis with rhinorrhea and post-nasal drip  -     Pt states that he has had 2 sinus infecitons in the last month. [facility]. Pt states that he was treated steroid shot, amoxicillin, and oral prednisone. Pt states that influenza and covid19 [facility]. Although no pressure or pain to palpation of sinuses today in office, will have pt switch from amoxicillin to amoxicillin-clavulanate 875-125mg (AUGMENTIN) 875-125 BID x 10 days for better coverage due to continuation of green rhinorrhea and post-nasal drip with productive cough with green phlegm. Pt's anterior rhinoscopy showed edema with some clear rhinorrhea, but pt had just cleared his nose of green mucous prior to me entering exam room.   - Use NeilMed sinus rinse two times daily for sinus hygiene and to help clear out sinuses. After carlos med sinus rinse and clearing out nose, use flonase 1 puff to each nostril twice daily and continue zyrtec once daily.     Environmental allergies  -     Pt states that he has issues with seasonal allergies. Pt states that his seasonal allergiees are worse during the summer/winter months. Pt denies history of allergy testing. Pt is to undergo environmental allergen blood testing for common environmental allergens.     Pt is to follow up in 2 weeks for symptom recheck and if he is still having sinus issues, then will proceed with nasal endoscopy for further assessment.

## 2022-06-26 ENCOUNTER — HOSPITAL ENCOUNTER (EMERGENCY)
Facility: HOSPITAL | Age: 22
Discharge: HOME | End: 2022-06-26
Attending: EMERGENCY MEDICINE
Payer: MEDICAID

## 2022-06-26 VITALS
SYSTOLIC BLOOD PRESSURE: 122 MMHG | WEIGHT: 220 LBS | RESPIRATION RATE: 18 BRPM | OXYGEN SATURATION: 99 % | DIASTOLIC BLOOD PRESSURE: 67 MMHG | HEART RATE: 86 BPM | BODY MASS INDEX: 31.5 KG/M2 | HEIGHT: 70 IN | TEMPERATURE: 99 F

## 2022-06-26 DIAGNOSIS — R05.9 COUGH: ICD-10-CM

## 2022-06-26 DIAGNOSIS — J45.20 MILD INTERMITTENT REACTIVE AIRWAY DISEASE WITHOUT COMPLICATION: ICD-10-CM

## 2022-06-26 DIAGNOSIS — J40 WHEEZY BRONCHITIS: Primary | ICD-10-CM

## 2022-06-26 DIAGNOSIS — R06.02 SOB (SHORTNESS OF BREATH): ICD-10-CM

## 2022-06-26 LAB — SARS-COV-2 RDRP RESP QL NAA+PROBE: NEGATIVE

## 2022-06-26 PROCEDURE — U0002 COVID-19 LAB TEST NON-CDC: HCPCS | Performed by: EMERGENCY MEDICINE

## 2022-06-26 PROCEDURE — 93010 ELECTROCARDIOGRAM REPORT: CPT | Mod: ,,, | Performed by: SPECIALIST

## 2022-06-26 PROCEDURE — 25000242 PHARM REV CODE 250 ALT 637 W/ HCPCS: Performed by: EMERGENCY MEDICINE

## 2022-06-26 PROCEDURE — 99285 EMERGENCY DEPT VISIT HI MDM: CPT | Mod: 25

## 2022-06-26 PROCEDURE — 93010 EKG 12-LEAD: ICD-10-PCS | Mod: ,,, | Performed by: SPECIALIST

## 2022-06-26 PROCEDURE — 94640 AIRWAY INHALATION TREATMENT: CPT

## 2022-06-26 PROCEDURE — 93005 ELECTROCARDIOGRAM TRACING: CPT

## 2022-06-26 RX ORDER — IBUPROFEN 800 MG/1
800 TABLET ORAL EVERY 6 HOURS PRN
Qty: 15 TABLET | Refills: 0 | Status: SHIPPED | OUTPATIENT
Start: 2022-06-26 | End: 2022-06-29

## 2022-06-26 RX ORDER — IPRATROPIUM BROMIDE AND ALBUTEROL SULFATE 2.5; .5 MG/3ML; MG/3ML
3 SOLUTION RESPIRATORY (INHALATION)
Status: COMPLETED | OUTPATIENT
Start: 2022-06-26 | End: 2022-06-26

## 2022-06-26 RX ORDER — ALBUTEROL SULFATE 2.5 MG/.5ML
2.5 SOLUTION RESPIRATORY (INHALATION) EVERY 4 HOURS PRN
Qty: 16 EACH | Refills: 0 | Status: SHIPPED | OUTPATIENT
Start: 2022-06-26 | End: 2022-09-12 | Stop reason: SDUPTHER

## 2022-06-26 RX ORDER — IPRATROPIUM BROMIDE AND ALBUTEROL SULFATE 2.5; .5 MG/3ML; MG/3ML
3 SOLUTION RESPIRATORY (INHALATION) EVERY 6 HOURS PRN
Qty: 30 ML | Refills: 0 | Status: SHIPPED | OUTPATIENT
Start: 2022-06-26 | End: 2022-09-12

## 2022-06-26 RX ORDER — BENZONATATE 100 MG/1
100 CAPSULE ORAL 3 TIMES DAILY PRN
Qty: 20 CAPSULE | Refills: 0 | Status: SHIPPED | OUTPATIENT
Start: 2022-06-26 | End: 2022-07-03

## 2022-06-26 RX ADMIN — IPRATROPIUM BROMIDE AND ALBUTEROL SULFATE 3 ML: 2.5; .5 SOLUTION RESPIRATORY (INHALATION) at 01:06

## 2022-06-26 NOTE — NURSING
Patient is alert & oriented, vital signs stable, denies any distress, Covid-19 swab sent as was negative. Reviewed discharge orders with patient, discharged home eli.

## 2022-06-26 NOTE — ED PROVIDER NOTES
Encounter Date: 6/26/2022       History     Chief Complaint   Patient presents with    Shortness of Breath     Worse over the week with coughing fits that make them feel as if they need to vomit. + headache (frontal)     This patient is a 22-year-old male with reported past history of exercise-induced asthma and cholecystectomy presenting with complaints of cough, headache, wheezing prior to arrival tonight.  He reports 1 week ago he was diagnosed with pharyngitis and placed on prednisone and Augmentin.  He reports he is continuing to take both of these medications.  He reports seeing otolaryngology a few days ago who recommended he continue these meds and offered further symptomatic recommendations.  He reports coughing so hard tonight he almost vomited but additionally thinks he was wheezing around the time of the coughing.  He also reports some chest pressure at this time.  He denies unilateral or bilateral lower extremity swelling or past history of DVT or PE.  He is mother denies significant past cardiac history.        Review of patient's allergies indicates:   Allergen Reactions    Benzoyl peroxide Dermatitis, Hives, Itching, Rash and Swelling     No past medical history on file.  Past Surgical History:   Procedure Laterality Date    CHOLECYSTECTOMY      LAPAROSCOPIC CHOLECYSTECTOMY  08/27/2020    LAPAROSCOPIC CHOLECYSTECTOMY  8/27/2020    Procedure: CHOLECYSTECTOMY, LAPAROSCOPIC;  Surgeon: Gian Peterson MD;  Location: [facility];  Service: General;;  [employer] video confirmed 8/21/dme    NASAL SINUS SURGERY  2003    Battery removed from nose    WISDOM TOOTH EXTRACTION       Family History   Problem Relation Age of Onset    Hypertension Mother     Heart disease Father     Lung disease Father     Asthma Father      Social History     Tobacco Use    Smoking status: Never Smoker    Smokeless tobacco: Never Used   Substance Use Topics    Alcohol use: Yes     Comment: occasionally     Drug use:  Never     Review of Systems   Constitutional: Negative for fever.   HENT: Positive for congestion.    Respiratory: Positive for cough.    Cardiovascular: Negative for chest pain.   Gastrointestinal: Negative for abdominal pain.   Genitourinary: Negative for dysuria.   Musculoskeletal: Negative for neck pain.   Skin: Negative for rash.   Allergic/Immunologic: Negative for immunocompromised state.   Neurological: Positive for headaches.   Hematological: Does not bruise/bleed easily.   Psychiatric/Behavioral: Negative for confusion.       Physical Exam     Initial Vitals [06/26/22 0044]   BP Pulse Resp Temp SpO2   (!) 143/71 105 20 99 °F (37.2 °C) 96 %      MAP       --         Physical Exam    Nursing note and vitals reviewed.  Constitutional: Vital signs are normal. He appears well-nourished.   HENT:   Head: Normocephalic and atraumatic.   Eyes: Conjunctivae and EOM are normal.   Neck: Neck supple. No thyroid mass present.   Normal range of motion.  Cardiovascular: Normal rate, regular rhythm and normal heart sounds. Exam reveals no gallop and no friction rub.    No murmur heard.  Pulmonary/Chest: He has wheezes. He has no rhonchi. He has no rales.   Occasional dry cough, scant expiratory wheeze bilaterally apices   Abdominal: Abdomen is soft. Bowel sounds are normal. There is no abdominal tenderness.   Musculoskeletal:         General: No edema. Normal range of motion.      Cervical back: Normal range of motion and neck supple.     Neurological: He is alert and oriented to person, place, and time. He has normal strength. No cranial nerve deficit or sensory deficit.   Skin: Skin is warm and dry. No rash noted. No erythema.   Psychiatric: He has a normal mood and affect. His speech is normal. Cognition and memory are normal.         ED Course   Procedures  Labs Reviewed   SARS-COV-2 RNA AMPLIFICATION, QUAL          Imaging Results          X-Ray Chest PA And Lateral (In process)                  Medications    albuterol-ipratropium 2.5 mg-0.5 mg/3 mL nebulizer solution 3 mL (3 mLs Nebulization Given 6/26/22 0121)     Medical Decision Making:   ED Management:  This patient was emergently assessed shortly after arrival in the presence of his mother.  He is seen alert and in no acute distress.  Normal work of breathing and oxygen saturations on room air.  COVID testing is negative here.  Chest x-ray is negative for focal infiltrate or other acute cardiopulmonary abnormality per my read.  He was provided a single breathing treatment and reports improvement.  He is asked to continue his steroids and antibiotics until completed but will be initiated on Tessalon Perles and provided nebulizer ampules for DuoNeb.  They are further educated about supportive care for suspected acute wheezy bronchitis and are asked to have him follow up with his primary care doctor as soon as possible.  They are asked to have him return to the emergency department immediately for any new, concerning, or worsening symptoms.  Patient was agreeable with this plan and he was discharged in stable condition.                      Clinical Impression:   Final diagnoses:  [R05.9] Cough  [R06.02] SOB (shortness of breath)  [J40] Wheezy bronchitis (Primary)          ED Disposition Condition    Discharge Stable        ED Prescriptions     Medication Sig Dispense Start Date End Date Auth. Provider    albuterol-ipratropium (DUO-NEB) 2.5 mg-0.5 mg/3 mL nebulizer solution Take 3 mLs by nebulization every 6 (six) hours as needed for Wheezing. Rescue 30 mL 6/26/2022 6/26/2023 Adam Khan MD    albuterol sulfate 2.5 mg/0.5 mL Nebu Take 2.5 mg by nebulization every 4 (four) hours as needed (for wheezing and SOB). Rescue 16 each 6/26/2022 6/26/2023 Adam Khan MD    benzonatate (TESSALON) 100 MG capsule Take 1 capsule (100 mg total) by mouth 3 (three) times daily as needed for Cough. 20 capsule 6/26/2022 7/3/2022 Adam Khan MD    ibuprofen  (ADVIL,MOTRIN) 800 MG tablet Take 1 tablet (800 mg total) by mouth every 6 (six) hours as needed for Pain. 15 tablet 6/26/2022 6/29/2022 Adam Khan MD        Follow-up Information     Follow up With Specialties Details Why Contact Info    Clinton Card MD Internal Medicine, Pediatrics Schedule an appointment as soon as possible for a visit   [address] [id number] [phone]             Adam Khan MD  06/26/22 0246

## 2022-06-27 LAB
A ALTERNATA IGE QN: <0.1 KU/L
A FUMIGATUS IGE QN: <0.1 KU/L
ALLERGEN BOXELDER MAPLE TREE IGE: <0.1 KU/L
ALLERGEN MAPLE (BOX ELDER) CLASS: NORMAL
ALLERGEN MULBERRY CLASS: NORMAL
ALLERGEN MULBERRY TREE IGE: <0.1 KU/L
ALLERGEN PIGWEED IGE: <0.1 KU/L
ALLERGEN WHITE ASH TREE IGE: <0.1 KU/L
AMER SYCAMORE IGE QN: <0.35 KU/L
BALD CYPRESS IGE QN: <0.1 KU/L
BERMUDA GRASS IGE QN: 3.66 KU/L
C HERBARUM IGE QN: <0.1 KU/L
CAT DANDER IGE QN: 0.2 KU/L
CEDAR IGE QN: <0.1 KU/L
COCKLEBUR IGE QN: <0.1 KU/L
COMMON PIGWEED CLASS: NORMAL
COMMON RAGWEED IGE QN: <0.1 KU/L
D FARINAE IGE QN: 0.65 KU/L
D PTERONYSS IGE QN: 0.41 KU/L
DEPRECATED A ALTERNATA IGE RAST QL: NORMAL
DEPRECATED A FUMIGATUS IGE RAST QL: NORMAL
DEPRECATED BALD CYPRESS IGE RAST QL: NORMAL
DEPRECATED BERMUDA GRASS IGE RAST QL: ABNORMAL
DEPRECATED C HERBARUM IGE RAST QL: NORMAL
DEPRECATED CAT DANDER IGE RAST QL: ABNORMAL
DEPRECATED CEDAR IGE RAST QL: NORMAL
DEPRECATED COCKLEBUR IGE RAST QL: NORMAL
DEPRECATED COMMON RAGWEED IGE RAST QL: NORMAL
DEPRECATED D FARINAE IGE RAST QL: ABNORMAL
DEPRECATED D PTERONYSS IGE RAST QL: ABNORMAL
DEPRECATED DOG DANDER IGE RAST QL: NORMAL
DEPRECATED ELDER IGE RAST QL: NORMAL
DEPRECATED ENGL PLANTAIN IGE RAST QL: ABNORMAL
DEPRECATED GOOSEFOOT IGE RAST QL: NORMAL
DEPRECATED JOHNSON GRASS IGE RAST QL: ABNORMAL
DEPRECATED KENT BLUE GRASS IGE RAST QL: ABNORMAL
DEPRECATED M RACEMOSUS IGE RAST QL: NORMAL
DEPRECATED MUGWORT IGE RAST QL: NORMAL
DEPRECATED NETTLE IGE RAST QL: NORMAL
DEPRECATED P NOTATUM IGE RAST QL: NORMAL
DEPRECATED PECAN/HICK TREE IGE RAST QL: NORMAL
DEPRECATED ROACH IGE RAST QL: NORMAL
DEPRECATED SHEEP SORREL IGE RAST QL: NORMAL
DEPRECATED SILVER BIRCH IGE RAST QL: ABNORMAL
DEPRECATED TIMOTHY IGE RAST QL: ABNORMAL
DEPRECATED WHITE OAK IGE RAST QL: ABNORMAL
DOG DANDER IGE QN: 0.1 KU/L
ELDER IGE QN: <0.1 KU/L
ELM CEDAR CLASS: NORMAL
ELM CEDAR, IGE: <0.1 KU/L
ENGL PLANTAIN IGE QN: 0.21 KU/L
FEATHER PANEL #2: <0.35 KU/L
GOOSEFOOT IGE QN: <0.1 KU/L
JOHNSON GRASS IGE QN: 3.19 KU/L
KENT BLUE GRASS IGE QN: 16.8 KU/L
M RACEMOSUS IGE QN: <0.1 KU/L
MUGWORT IGE QN: <0.1 KU/L
NETTLE IGE QN: <0.1 KU/L
P NOTATUM IGE QN: <0.1 KU/L
PECAN/HICK TREE IGE QN: <0.1 KU/L
ROACH IGE QN: <0.1 KU/L
SHEEP SORREL IGE QN: <0.1 KU/L
SILVER BIRCH IGE QN: 1.8 KU/L
TIMOTHY IGE QN: 12.2 KU/L
WHITE ASH CLASS: NORMAL
WHITE OAK IGE QN: 2.67 KU/L

## 2022-06-28 ENCOUNTER — TELEPHONE (OUTPATIENT)
Dept: OTOLARYNGOLOGY | Facility: CLINIC | Age: 22
End: 2022-06-28
Payer: MEDICAID

## 2022-06-28 DIAGNOSIS — Z91.09 ENVIRONMENTAL ALLERGIES: Primary | ICD-10-CM

## 2022-06-28 NOTE — TELEPHONE ENCOUNTER
Contacted and spoke w/pt. Reviewed the following result note and instructions w/pt, per provider. Pt does wish to continue with allergy referral; advised that once provider has placed the referral in, I will give him a call to get it scheduled.     No further ENT questions/concerns at this time.      ----- Message from Fahad Hart PA-C sent at 6/27/2022  6:00 PM CDT -----  Please tell pt that allergy testing came back positive for silver birch, meadow grass, carlos eduardo grass, oak, elder grass, bermuda grass, dust mites, and cat. Pt is to use NeilMed sinus rinse two times daily. After clearing out nose, use flonase 1 puff to each nostril twice daily. Continue zyrtec daily as instructed in office. Pt would benefit for referral to allergy. I will place allergy referral if pt wishes to proceed with consult with allergist. Pt is to follow up at next visit which is planned already and we will do nasal endoscopy and check on sinus symptoms.

## 2022-07-05 ENCOUNTER — HOSPITAL ENCOUNTER (EMERGENCY)
Facility: HOSPITAL | Age: 22
Discharge: HOME | End: 2022-07-05
Attending: EMERGENCY MEDICINE
Payer: MEDICAID

## 2022-07-05 VITALS
TEMPERATURE: 99 F | RESPIRATION RATE: 20 BRPM | OXYGEN SATURATION: 100 % | SYSTOLIC BLOOD PRESSURE: 137 MMHG | DIASTOLIC BLOOD PRESSURE: 88 MMHG | HEART RATE: 104 BPM | WEIGHT: 238 LBS | BODY MASS INDEX: 34.15 KG/M2

## 2022-07-05 DIAGNOSIS — S82.892A CLOSED FRACTURE OF LEFT ANKLE, INITIAL ENCOUNTER: Primary | ICD-10-CM

## 2022-07-05 DIAGNOSIS — T14.90XA INJURY: ICD-10-CM

## 2022-07-05 PROCEDURE — 29515 APPLICATION SHORT LEG SPLINT: CPT | Mod: LT

## 2022-07-05 PROCEDURE — 99283 EMERGENCY DEPT VISIT LOW MDM: CPT | Mod: 25

## 2022-07-05 NOTE — Clinical Note
"Erasto Negreteel" Akiko was seen and treated in our emergency department on 7/5/2022.  He may return to work on 07/10/2022.       If you have any questions or concerns, please don't hesitate to call.      Brenda Lee NP"

## 2022-07-06 ENCOUNTER — TELEPHONE (OUTPATIENT)
Dept: OTOLARYNGOLOGY | Facility: CLINIC | Age: 22
End: 2022-07-06
Payer: MEDICAID

## 2022-07-06 ENCOUNTER — TELEPHONE (OUTPATIENT)
Dept: ORTHOPEDICS | Facility: CLINIC | Age: 22
End: 2022-07-06
Payer: MEDICAID

## 2022-07-06 ENCOUNTER — TELEPHONE (OUTPATIENT)
Dept: ALLERGY | Facility: CLINIC | Age: 22
End: 2022-07-06
Payer: MEDICAID

## 2022-07-06 NOTE — TELEPHONE ENCOUNTER
----- Message from Rajiv Ordonez sent at 7/6/2022 11:01 AM CDT -----  Type:  Sooner Appointment Request    Caller is requesting a sooner appointment.  Caller declined first available appointment listed below.  Caller will not accept being placed on the waitlist and is requesting a message be sent to doctor.    Name of Caller:  Mother/ Lexie Wharton  When is the first available appointment?  Pt needs to reschedule--Out of Template  Symptoms:  Sinus infection   Best Call Back Number:  [phone]  Additional Information:

## 2022-07-06 NOTE — TELEPHONE ENCOUNTER
----- Message from Rebekah Galicia sent at 7/6/2022  4:06 PM CDT -----  Regarding: appt  Contact: Lexie (mother) @ [phone]  Pt calling to schedule ER follow up per his discharge instructions: Schedule an appointment with Jak Espinoza MD (Orthopedic Surgery) in 1 day (7/6/2022); for ER visit follow up and re-evaluation, orthopedic follow up for ankle fracture. Please call.

## 2022-07-06 NOTE — ED PROVIDER NOTES
Encounter Date: 7/5/2022       History     Chief Complaint   Patient presents with    Ankle Pain     Left ankle injury from jumping on trampoline     Rolled L ankle at [employer]. Has pain to anterior left ankle. Injury occurred tonight. Unable to bear weight since. No hx of prior fracture. No other injuries.  No significant swelling.  No open wound.  He reports no pain to his left knee left hip denies hitting his head denies injuring his back or unaffected right lower extremity.  No history of prior fractures.        Review of patient's allergies indicates:   Allergen Reactions    Benzoyl peroxide Dermatitis, Hives, Itching, Rash and Swelling     No past medical history on file.  Past Surgical History:   Procedure Laterality Date    CHOLECYSTECTOMY      LAPAROSCOPIC CHOLECYSTECTOMY  08/27/2020    LAPAROSCOPIC CHOLECYSTECTOMY  8/27/2020    Procedure: CHOLECYSTECTOMY, LAPAROSCOPIC;  Surgeon: Gian Peterson MD;  Location: [facility];  Service: General;;  Proper Cloth video confirmed 8/21/dme    NASAL SINUS SURGERY  2003    Battery removed from nose    WISDOM TOOTH EXTRACTION       Family History   Problem Relation Age of Onset    Hypertension Mother     Heart disease Father     Lung disease Father     Asthma Father      Social History     Tobacco Use    Smoking status: Never Smoker    Smokeless tobacco: Never Used   Substance Use Topics    Alcohol use: Yes     Comment: occasionally     Drug use: Never     Review of Systems   Constitutional: Negative for fever.   HENT: Negative for congestion, postnasal drip, rhinorrhea, sinus pressure, sinus pain, sneezing, sore throat and trouble swallowing.    Eyes: Negative for photophobia and visual disturbance.   Respiratory: Negative for cough, choking, chest tightness, shortness of breath and wheezing.    Cardiovascular: Negative for chest pain, palpitations and leg swelling.   Gastrointestinal: Negative for abdominal pain, constipation, diarrhea, nausea  and vomiting.   Endocrine: Negative for polydipsia, polyphagia and polyuria.   Genitourinary: Negative for difficulty urinating and dysuria.   Musculoskeletal: Positive for arthralgias, gait problem and myalgias. Negative for back pain, joint swelling, neck pain and neck stiffness.   Skin: Negative for color change, rash and wound.   Allergic/Immunologic: Negative for immunocompromised state.   Neurological: Negative for dizziness and weakness.   Hematological: Does not bruise/bleed easily.   Psychiatric/Behavioral: Negative for agitation.   All other systems reviewed and are negative.      Physical Exam     Initial Vitals [07/05/22 2010]   BP Pulse Resp Temp SpO2   137/88 104 20 98.6 °F (37 °C) 100 %      MAP       --         Physical Exam    Nursing note and vitals reviewed.  Constitutional: He appears well-developed and well-nourished. He is not diaphoretic. No distress.   HENT:   Head: Normocephalic and atraumatic.   Right Ear: External ear normal.   Left Ear: External ear normal.   Nose: Nose normal.   Mouth/Throat: Oropharynx is clear and moist. No oropharyngeal exudate.   Eyes: Conjunctivae are normal.   Neck: Neck supple.   Normal range of motion.  Cardiovascular: Normal rate.   No murmur heard.  Pulmonary/Chest: Breath sounds normal. He has no wheezes. He has no rhonchi. He has no rales.   Abdominal: Abdomen is soft. Bowel sounds are normal. There is no abdominal tenderness.   Musculoskeletal:         General: Tenderness present.      Cervical back: Normal range of motion and neck supple.      Right hip: Normal.      Left hip: Normal.      Right upper leg: Normal.      Left upper leg: Normal.      Right knee: Normal.      Left knee: Normal.      Right lower leg: Normal.      Left lower leg: Normal.      Right ankle: Normal.      Left ankle: No swelling, deformity, ecchymosis or lacerations. Tenderness present over the proximal fibula. Decreased range of motion. Anterior drawer test negative. Normal pulse.       Left Achilles Tendon: Normal.      Right foot: Normal.      Left foot: Tenderness present. No bony tenderness.     Neurological: He is alert and oriented to person, place, and time. He has normal strength. GCS score is 15. GCS eye subscore is 4. GCS verbal subscore is 5. GCS motor subscore is 6.   Skin: Skin is warm and dry. Capillary refill takes less than 2 seconds. No rash noted. No erythema.   Psychiatric: He has a normal mood and affect. Thought content normal.         ED Course   Splint Application    Date/Time: 7/5/2022 9:31 PM  Performed by: Brenda Lee NP  Authorized by: Suraj Patel MD   Consent Done: Emergent Situation  Location details: left ankle  Splint type: short leg  Supplies used: Ortho-Glass  Post-procedure: The splinted body part was neurovascularly unchanged following the procedure.  Patient tolerance: Patient tolerated the procedure well with no immediate complications        Labs Reviewed - No data to display       Imaging Results          X-Ray Foot Complete Left (In process)                X-Ray Ankle Complete Left (In process)                  Medications   ibuprofen tablet 600 mg (has no administration in time range)     Medical Decision Making:   X-ray of the left ankle concerning for nondisplaced distal tibial fracture on wet read.  Will follow final radiology interpretation when available as well.  Will place in a posterior short-leg Ortho Glass splint provided crutches, nonweightbearing instructions, ice and Motrin in the ER, and have patient follow-up with orthopedist for re-evaluation further management.  Rice instructions discussed in detail crutch walking and cast care discussed in detail as well he understands when return back to the ER.  Neurovascularly intact post the splint application.                      Clinical Impression:   Final diagnoses:  [T14.90XA] Injury  [S82.892A] Closed fracture of left ankle, initial encounter - possible distal tibial fracture,  nondisplaced. (Primary)          ED Disposition Condition    Discharge Stable        ED Prescriptions     None        Follow-up Information     Follow up With Specialties Details Why Contact Info Additional Information    Clinton Card MD Internal Medicine, Pediatrics Schedule an appointment as soon as possible for a visit in 2 days  [address] [place] [address]  [phone]       Jak Espinoza MD Orthopedic Surgery, Surgery, Sports Medicine Schedule an appointment as soon as possible for a visit in 1 day for ER visit follow up and re-evaluation, orthopedic follow up for ankle fracture [address] [facility]  [address]  [facility] [address]  [phone]       [facility] - Emergency Dept Emergency Medicine Go to  As needed, If symptoms worsen [address] [facility] [address]  [phone] 1st floor           Brenda Lee NP  07/05/22 9251

## 2022-07-07 ENCOUNTER — OFFICE VISIT (OUTPATIENT)
Dept: PODIATRY | Facility: CLINIC | Age: 22
End: 2022-07-07
Payer: MEDICAID

## 2022-07-07 VITALS — BODY MASS INDEX: 32.93 KG/M2 | HEART RATE: 92 BPM | OXYGEN SATURATION: 98 % | HEIGHT: 70 IN | WEIGHT: 230 LBS

## 2022-07-07 DIAGNOSIS — M25.572 ACUTE LEFT ANKLE PAIN: ICD-10-CM

## 2022-07-07 DIAGNOSIS — S93.409A GRADE 3 ANKLE SPRAIN: Primary | ICD-10-CM

## 2022-07-07 DIAGNOSIS — M25.472 SWELLING OF LEFT ANKLE JOINT: ICD-10-CM

## 2022-07-07 PROCEDURE — 1159F PR MEDICATION LIST DOCUMENTED IN MEDICAL RECORD: ICD-10-PCS | Mod: CPTII,S$GLB,, | Performed by: PODIATRIST

## 2022-07-07 PROCEDURE — 99203 OFFICE O/P NEW LOW 30 MIN: CPT | Mod: S$GLB,,, | Performed by: PODIATRIST

## 2022-07-07 PROCEDURE — 3008F BODY MASS INDEX DOCD: CPT | Mod: CPTII,S$GLB,, | Performed by: PODIATRIST

## 2022-07-07 PROCEDURE — 1159F MED LIST DOCD IN RCRD: CPT | Mod: CPTII,S$GLB,, | Performed by: PODIATRIST

## 2022-07-07 PROCEDURE — 1160F RVW MEDS BY RX/DR IN RCRD: CPT | Mod: CPTII,S$GLB,, | Performed by: PODIATRIST

## 2022-07-07 PROCEDURE — 3008F PR BODY MASS INDEX (BMI) DOCUMENTED: ICD-10-PCS | Mod: CPTII,S$GLB,, | Performed by: PODIATRIST

## 2022-07-07 PROCEDURE — 99203 PR OFFICE/OUTPT VISIT, NEW, LEVL III, 30-44 MIN: ICD-10-PCS | Mod: S$GLB,,, | Performed by: PODIATRIST

## 2022-07-07 PROCEDURE — 1160F PR REVIEW ALL MEDS BY PRESCRIBER/CLIN PHARMACIST DOCUMENTED: ICD-10-PCS | Mod: CPTII,S$GLB,, | Performed by: PODIATRIST

## 2022-07-07 NOTE — PATIENT INSTRUCTIONS
Understanding an Ankle Fracture    The ankle is formed by bones in the lower leg (tibia and fibula) and the bone on top of the foot (talus). When you have a fracture of the ankle, it means that one or more of the bones in the ankle are broken. The bone may be cracked, broken into two or more pieces, or even shattered. The pieces of bone may be lined up or they may have moved out of place. Sometimes, the bone may break through the skin. Nearby ligaments may also be damaged. Depending on how badly the bone is broken, healing may take a few months or longer.   What causes an ankle fracture?  Ankle fractures are often caused from severely twisting or rolling the ankle. They may also be caused from a fall, blow, accident, or sports injury.  Symptoms of an ankle fracture  Symptoms can include pain, swelling, and bruising. If the bone breaks through the skin, bleeding at the site can also occur. The ankle may look crooked, deformed, or bent. Also, it may be hard to move or use the ankle and foot as you would normally.  Treating an ankle fracture  Treatment for an ankle fracture depends on where the bone is broken and how serious the break is. If needed, the bone is put back into place. This may be done with or without surgery. If surgery is needed, the surgeon may use devices such as pins, plates, or screws to hold the bone together. Usually, you will wear a splint, brace, or short leg cast to keep the bone in place and protect it from injury during healing. Other treatments may also be used to help reduce symptoms or regain function. These include:  Rest. You may need to avoid walking or putting any weight on the broken ankle for a period of time. Severe fractures need a longer limit on weight-bearing activities.  Cold packs. Putting an ice pack over the injured area may help reduce swelling and pain.  Compression. An elastic bandage may be wrapped around the ankle to help reduce swelling.  Elevation. Propping up the  ankle so that it is above your heart may ease swelling.  Pain medicines. Prescription or over-the-counter pain medicines may help reduce pain and swelling. If needed, stronger pain medicines may be prescribed.  Exercises. Your healthcare provider may give you certain exercises to do at home or with a physical therapist. These help restore strength, flexibility, and range of motion in the ankle and foot.  Possible complications of an ankle fracture  These can include:  Poor healing of the bone  Weakness, stiffness, or loss of range of motion in the ankle  Osteoarthritis in the ankle  When to call your healthcare provider  Call your healthcare provider right away if you have any of these:  Fever of 100.4°F (38°C) or higher, or as directed  Symptoms that dont get better with treatment, or get worse  Numbness, tingling, or coldness in your foot or toes  Toenails that turn blue or grey in color  A splint, brace, or cast that is damaged or feels too tight or loose  Unusual redness, warmth, swelling, bleeding, or drainage from any wounds or incision sites  New symptoms   Date Last Reviewed: 3/10/2016  © 6945-7154 [employer]. [address] [place] [address]. All rights reserved. This information is not intended as a substitute for professional medical care. Always follow your healthcare professional's instructions.

## 2022-07-07 NOTE — LETTER
July 7, 2022      [facility] - Podiatry  [address] [facility] [place] [address]  Phone: [phone]  Fax: [phone]       Patient: Erasto Wharton   YOB: 2000  Date of Visit: 07/07/2022    To Whom It May Concern:    Kris Wharton  was at [facility] on 07/07/2022. The patient may return to work/school on 074/08/2022 with restrictions of light duty seated work with use of boot cast. If you have any questions or concerns, or if I can be of further assistance, please do not hesitate to contact me.    Sincerely,    Electronically Signed by: ANAIS Morales DPM

## 2022-07-11 ENCOUNTER — PATIENT OUTREACH (OUTPATIENT)
Dept: EMERGENCY MEDICINE | Facility: HOSPITAL | Age: 22
End: 2022-07-11

## 2022-07-11 NOTE — PROGRESS NOTES
Monisha Benedict  ED Navigator  Emergency Department    Project: [facility] ED Navigator  Role: Community Health Worker    Date: 07/11/2022  Patient Name: Erasto Wharton  MRN: [id number]  PCP: Primary Doctor No    Assessment:     Erasto Wharton is a 22 y.o. male who has presented to ED for strained foot. Patient has visited the ED 2 times in the past 3 months. Patient did not contact PCP.     ED Navigator Initial Assessment    ED Navigator Enrollment Documentation  Consent to Services  Does patient consent to completing the assessment?: Yes  Contact  Method of Initial Contact: Phone  Transportation  Does the patient have issues with Transportation?: No  Does the patient have transportation to and from healthcare appointments?: Yes  Insurance Coverage  Do you have coverage/adequate coverage?: Yes  Type/kind of coverage: Medicaid  Is patient able to afford co-pays/deductibles?: Yes  Is patient able to afford HME or supplies?: Yes  Does patient have an established [facility] PCP?: Yes  Able to access?: Yes  Does the patient have a lack of adequate coverage?: No  Specialist Appointment  Did the patient come to the ED to see a specialist?: No  Does the patient have a pending specialist referral?: No  Does the patient have a specialist appointment made?: No  PCP Follow Up Appointment  Has the patient had an appointment with a primary care provider in the past year?: Yes  Approximate date: 7/4/22  Provider: Stefan Amato MD  Does the patient have a follow up appontment with a PCP?: Yes  Upcoming appointment date: 7/12/22  Provider: Stefan Amato MD  When was the last time you saw your PCP?: 7/4/22  Medications  Is patient able to afford medication?: Yes  Is patient on pharmacy assistance program?: Yes  Is patient unable to get medication due to lack of transportation?: No  Psychological  Does the patient have psycho-social concerns?: No  Food  Does the patient have concerns about food?: No  Communication/Education  Does the patient have  limited English proficiency/English not primary language?: No  Does patient have low literacy and/or low health literacy?: Yes  Does patient have concerns with care?: No  Does patient have dissatisfaction with care?: No  Other Financial Concerns  Does the patient have immediate financial distress?: No  Does the patient have general financial concerns?: No  Other Social Barriers/Concerns  Does the patient have any additional barriers or concerns?: None  Primary Barrier  Barriers identified: Patient identified no barriers to care  Root Cause of ED Utilization: Patient Knowledge/Low Health Literacy  Plan to address Patient Knowledge/Low Health Literacy: Provided information for [facility] 24/7 Nurse triage line [phone] or [facility] ([phone])  Next steps: Provided Education  Was education/educational materials provided surrounding PCP services/creating a medical home?: Yes Was education verbal or written?: Verbal   Was education/educational materials provided surrounding low cost, healthy foods?: Yes Was education verbal or written?: Verbal   Was education/educational materials provided surrounding other items? If so, use comment to explain.: No    Plan: Provided information for [facility] 24/7 Nurse triage line, [phone] or [facility] ([phone])  Expected Date of Follow Up 1: 7/25/22  Additional Documentation: ED Navigator spoke with patient on phone and completed assessment. Patient denied any concerns with housing, utilities, food or transportation. Patient stated he would be visiting his PCP for a follow up appointment tomorrow and I advised that I would check in on him to se how things went and extend the offer for resources following the appointment. He was grateful and agreed. I will follow up in a couple of weeks. The following resources were provided [facility] brochure, Healthy Heart Diet information, and [facility] 24/7 Nurse Triage line.           Social History      Socioeconomic History    Marital status: Single   Tobacco Use    Smoking status: Never Smoker    Smokeless tobacco: Never Used   Substance and Sexual Activity    Alcohol use: Yes     Comment: occasionally     Drug use: Never    Sexual activity: Yes     Partners: Female     Birth control/protection: OCP     Social Determinants of Health     Financial Resource Strain: Low Risk     Difficulty of Paying Living Expenses: Not very hard   Food Insecurity: No Food Insecurity    Worried About Running Out of Food in the Last Year: Never true    Ran Out of Food in the Last Year: Never true   Transportation Needs: No Transportation Needs    Lack of Transportation (Medical): No    Lack of Transportation (Non-Medical): No   Physical Activity: Insufficiently Active    Days of Exercise per Week: 3 days    Minutes of Exercise per Session: 20 min   Stress: Stress Concern Present    Feeling of Stress : To some extent   Social Connections: Socially Isolated    Frequency of Communication with Friends and Family: More than three times a week    Frequency of Social Gatherings with Friends and Family: Twice a week    Attends Alevism Services: Never    Active Member of Clubs or Organizations: No    Attends Club or Organization Meetings: Never    Marital Status: Never    Housing Stability: Low Risk     Unable to Pay for Housing in the Last Year: No    Number of Places Lived in the Last Year: 1    Unstable Housing in the Last Year: No       Plan:     ED Navigator spoke with patient on phone and completed assessment. Patient denied any concerns with housing, utilities, food or transportation. Patient stated he would be visiting his PCP for a follow up appointment tomorrow and I advised that I would check in on him to se how things went and extend the offer for resources following the appointment. He was grateful and agreed. I will follow up in a couple of weeks. The following resources were provided [facility] brochure, Healthy Heart Diet information, and [facility] 24/7 Nurse Triage line.

## 2022-07-21 ENCOUNTER — OFFICE VISIT (OUTPATIENT)
Dept: OTOLARYNGOLOGY | Facility: CLINIC | Age: 22
End: 2022-07-21
Payer: MEDICAID

## 2022-07-21 VITALS — RESPIRATION RATE: 18 BRPM | WEIGHT: 235.69 LBS | BODY MASS INDEX: 33.74 KG/M2 | TEMPERATURE: 98 F | HEIGHT: 70 IN

## 2022-07-21 DIAGNOSIS — Z87.19 HISTORY OF GASTROESOPHAGEAL REFLUX (GERD): ICD-10-CM

## 2022-07-21 DIAGNOSIS — J02.9 SORE THROAT: Primary | ICD-10-CM

## 2022-07-21 DIAGNOSIS — J34.2 NASAL SEPTAL DEVIATION: ICD-10-CM

## 2022-07-21 DIAGNOSIS — J34.89 NASAL SEPTAL PERFORATION: ICD-10-CM

## 2022-07-21 PROCEDURE — 31575 DIAGNOSTIC LARYNGOSCOPY: CPT | Mod: PBBFAC,PO | Performed by: PHYSICIAN ASSISTANT

## 2022-07-21 PROCEDURE — 31575 DIAGNOSTIC LARYNGOSCOPY: CPT | Mod: S$PBB,,, | Performed by: PHYSICIAN ASSISTANT

## 2022-07-21 PROCEDURE — 31575 PR LARYNGOSCOPY, FLEXIBLE; DIAGNOSTIC: ICD-10-PCS | Mod: S$PBB,,, | Performed by: PHYSICIAN ASSISTANT

## 2022-07-21 PROCEDURE — 99999 PR PBB SHADOW E&M-EST. PATIENT-LVL IV: ICD-10-PCS | Mod: PBBFAC,,, | Performed by: PHYSICIAN ASSISTANT

## 2022-07-21 PROCEDURE — 99214 PR OFFICE/OUTPT VISIT, EST, LEVL IV, 30-39 MIN: ICD-10-PCS | Mod: 25,S$PBB,, | Performed by: PHYSICIAN ASSISTANT

## 2022-07-21 PROCEDURE — 99214 OFFICE O/P EST MOD 30 MIN: CPT | Mod: PBBFAC,PO,25 | Performed by: PHYSICIAN ASSISTANT

## 2022-07-21 PROCEDURE — 99999 PR PBB SHADOW E&M-EST. PATIENT-LVL IV: CPT | Mod: PBBFAC,,, | Performed by: PHYSICIAN ASSISTANT

## 2022-07-21 PROCEDURE — 3008F BODY MASS INDEX DOCD: CPT | Mod: CPTII,,, | Performed by: PHYSICIAN ASSISTANT

## 2022-07-21 PROCEDURE — 3008F PR BODY MASS INDEX (BMI) DOCUMENTED: ICD-10-PCS | Mod: CPTII,,, | Performed by: PHYSICIAN ASSISTANT

## 2022-07-21 PROCEDURE — 99214 OFFICE O/P EST MOD 30 MIN: CPT | Mod: 25,S$PBB,, | Performed by: PHYSICIAN ASSISTANT

## 2022-07-21 NOTE — PROCEDURES
Laryngoscopy    Date/Time: 7/21/2022 8:45 AM  Performed by: Fahad Hart PA-C  Authorized by: Fahad Hart PA-C     Consent Done?:  Yes (Verbal)  Anesthesia:     Local anesthetic:  Lidocaine 4% and Andi-Synephrine 1/2%    Patient tolerance:  Patient tolerated the procedure well with no immediate complications    Decongestion performed?: Yes    Laryngoscopy:     Areas examined:  Nasal cavities, nasopharynx, oropharynx, hypopharynx, larynx and vocal cords    Laryngoscope size: disposable ambu scope.  Nose External:      No external nasal deformity  Nose Intranasal:      Mucosa no polyps     Mucosa ulcers not present     No mucosa lesions present     Septum gross deformity (right nasal septal deviation with mid nasal sepum perforation)     Turbinates not enlarged  Nasopharynx:      No mucosa lesions     Adenoids present (adenoid hypertrophy not occluding eustachian tubes)     Posterior choanae patent     Eustachian tube patent  Larynx/hypopharynx:      No epiglottis lesions     No epiglottis edema     No AE folds lesions     No vocal cord polyps     Equal and normal bilateral     No hypopharynx lesions     No piriform sinus pooling     No piriform sinus lesions     Post cricoid edema     Post cricoid erythema

## 2022-07-21 NOTE — PROGRESS NOTES
[facility]    Subjective:      Patient: Erasto Wharton Patient PCP: Primary Doctor No         :  2000     Sex:  male      MRN:  [id number]          Date of Visit: 2022      Chief Complaint: Sinus follow up    Patient ID 2022: Erasto Wharton is a 22 y.o. male who was referred to me by Dr. Clinton Card in consultation for sinus infection. Pt began having sore throat 1 week ago with sinus congetsion that started 2 days later. [facility] and was told he had pharyngitis. Pt states that he was treated steroid shot, amoxicillin, and oral prednisone. Pt states that influenza and covid19 came back negative at [facility]. Pt states that throat symptoms were alleviated with treatment. Pt has not yet completed amoxicillin. Pt states that he has a throat tickle right now. Pt states he is having green rhinorrhea which is hard to get out he has had productive cough with green mucous. Pt had last episode of green productive cough morning. Pt states that he has had 2 sinus infecitons in the last month. Pt states that he is taking zyrtec daily and flonase 1 puff to each nostril once in them morning. Pt states that he has sinus congestion symptoms rather than true sinus pain/pressure. Pt denies fever/chills.     Pt states that he has issues with seasonal allergies. Pt states that his seasonal allergiees are worse during the summer/winter months. Pt denies history of allergy testing.     Interval History 2022: Pt was switched from amoxicillin to Augmentin 875mg BID x 10 days for sinusitis as he was having residual sinus issues with amoxicillin reported at last visit. Pt was advised at last visit that if he had residual sinus issues at follow up, then we may proceed with nasal endoscopy to further assess. Pt had environmental allergy blood testing completed 2022 that came back positive for silver birch, meadow grass, carlos eduardo grass, oak, elder grass, bermuda grass, dust mites,  and cat. [facility] use NeilMed sinus rinse two times daily. After clearing out nose, use flonase 1 puff to each nostril twice daily. Continue zyrtec daily. Referral to allergy was placed and pt has planned follow up with Allergist Dr. Maribell Grullonwork planned for 10/20/2022. Since last visit, pt was seen in ED 06/26/2022 and for shortness of breath. Pt has history of exercise induced asthma. Covid19 testing at ED was negative. Pt had CXR at ED that was unremarkable. Pt was started on tessalon perles at ED and provided nebulizer ampules for DuoNeb and was diagnosed with bronchitis. Pt presents today in office and states that he is no longer having shortness of breath or wheezing since his ED visit. Pt states today in office that his sinus issues have alleviated, but he states he has sore throat when he wakes up in the morning that goes away throughout the day. Pt had history of GERD and is on omeprazole 40mg at night, which he does not take nightly. Pt states that he gets acid reflux symptoms around once a week. Pt states that he no longer having nasal congestion, PND, or runny nose. Pt denies fever/chills or sinus pressure/pain. Pt's father has history of allergies and nasal polyps. Pt denies snoring or regular mouth breathing, or apneic episodes. Pt denies history of tonsillectomy/adenoidectomy. Pt denies chronic ear complaints.     SNOT-22 score: : (P) 10  NOSE score:: (P) 35%  ETDQ-7 score:: (P) 3.1    Review of Systems   Constitutional: Negative.    HENT: Positive for sore throat. Negative for trouble swallowing.    Eyes: Negative.    Respiratory: Negative.    Cardiovascular: Negative.    Gastrointestinal: Negative.    Endocrine: Negative.    Genitourinary: Negative.    Musculoskeletal: Negative.    Skin: Negative.    Allergic/Immunologic: Negative.    Neurological: Positive for headaches.   Hematological: Negative.    Psychiatric/Behavioral: Negative.       Past Medical  History  He has no past medical history on file.    Family History  His family history includes Asthma in his father; Heart disease in his father; Hypertension in his mother; Lung disease in his father.    Past Surgical History:   Procedure Laterality Date    CHOLECYSTECTOMY      LAPAROSCOPIC CHOLECYSTECTOMY  08/27/2020    LAPAROSCOPIC CHOLECYSTECTOMY  8/27/2020    Procedure: CHOLECYSTECTOMY, LAPAROSCOPIC;  Surgeon: Gian Peterson MD;  Location: [facility];  Service: General;;  wilbur video confirmed 8/21/dme    NASAL SINUS SURGERY  2003    Battery removed from nose    WISDOM TOOTH EXTRACTION       Social History     Tobacco Use    Smoking status: Never Smoker    Smokeless tobacco: Never Used   Substance and Sexual Activity    Alcohol use: Yes     Comment: occasionally     Drug use: Never    Sexual activity: Yes     Partners: Female     Birth control/protection: OCP     Medications  He has a current medication list which includes the following prescription(s): albuterol, albuterol sulfate, albuterol-ipratropium, cetirizine, clindamycin, dexamethasone, fluticasone propionate, methylphenidate hcl, minocycline, omeprazole, omeprazole, and ondansetron.    Review of patient's allergies indicates:   Allergen Reactions    Benzoyl peroxide Dermatitis, Hives, Itching, Rash and Swelling     All medications, allergies, and past history have been reviewed.    Objective:      Vitals:  Vitals - 1 value per visit 7/7/2022 7/21/2022 7/21/2022   SYSTOLIC - - -   DIASTOLIC - - -   Pulse 92 - -   Temp - - 98.3   Resp - - 18   SPO2 98 - -   Weight (lb) 230 - 235.67   Weight (kg) 104.327 - 106.9   Height 70 - 70   BMI (Calculated) 33 - 33.8   VISIT REPORT - - -   Pain Score  - 0 -       Body surface area is 2.3 meters squared.  Physical Exam  Constitutional:       General: He is not in acute distress.     Appearance: Normal appearance. He is not ill-appearing.   HENT:      Head: Normocephalic and atraumatic.      Right  Ear: Tympanic membrane, ear canal and external ear normal.      Left Ear: Tympanic membrane, ear canal and external ear normal.      Nose: Septal deviation (right) present.      Mouth/Throat:      Lips: Pink. No lesions.      Mouth: Mucous membranes are moist. No oral lesions.      Tongue: No lesions.      Palate: No lesions.      Pharynx: Oropharynx is clear. Uvula midline. No pharyngeal swelling, oropharyngeal exudate, posterior oropharyngeal erythema or uvula swelling.      Tonsils: No tonsillar exudate or tonsillar abscesses. 2+ on the right. 2+ on the left.   Eyes:      General:         Right eye: No discharge.         Left eye: No discharge.      Extraocular Movements: Extraocular movements intact.      Conjunctiva/sclera: Conjunctivae normal.   Pulmonary:      Effort: Pulmonary effort is normal.   Neurological:      General: No focal deficit present.      Mental Status: He is alert and oriented to person, place, and time. Mental status is at baseline.   Psychiatric:         Mood and Affect: Mood normal.         Behavior: Behavior normal.         Thought Content: Thought content normal.         Judgment: Judgment normal.     Laryngoscopy     Date/Time: 7/21/2022 8:45 AM  Performed by: Fahad Hart PA-C  Authorized by: Fahad Hart PA-C      Consent Done?:  Yes (Verbal)  Anesthesia:     Local anesthetic:  Lidocaine 4% and Andi-Synephrine 1/2%    Patient tolerance:  Patient tolerated the procedure well with no immediate complications    Decongestion performed?: Yes    Laryngoscopy:     Areas examined:  Nasal cavities, nasopharynx, oropharynx, hypopharynx, larynx and vocal cords    Laryngoscope size: disposable ambu scope.  Nose External:      No external nasal deformity  Nose Intranasal:      Mucosa no polyps     Mucosa ulcers not present     No mucosa lesions present     Septum gross deformity (right nasal septal deviation with mid nasal sepum perforation)     Turbinates not  enlarged  Nasopharynx:      No mucosa lesions     Adenoids present (adenoid hypertrophy not occluding eustachian tubes)     Posterior choanae patent     Eustachian tube patent  Larynx/hypopharynx:      No epiglottis lesions     No epiglottis edema     No AE folds lesions     No vocal cord polyps     Equal and normal bilateral     No hypopharynx lesions     No piriform sinus pooling     No piriform sinus lesions     Post cricoid edema     Post cricoid erythema    Labs:  WBC   Date Value Ref Range Status   07/30/2020 14.64 (H) 3.90 - 12.70 K/uL Final     Eosinophil %   Date Value Ref Range Status   07/30/2020 1.8 0.0 - 8.0 % Final     Eos #   Date Value Ref Range Status   07/30/2020 0.3 0.0 - 0.5 K/uL Final     Platelets   Date Value Ref Range Status   07/30/2020 282 150 - 350 K/uL Final     Glucose   Date Value Ref Range Status   07/30/2020 97 70 - 110 mg/dL Final     All lab results, imaging results, and data have been reviewed.    Assessment:        ICD-10-CM ICD-9-CM   1. Sore throat  J02.9 462   2. History of gastroesophageal reflux (GERD)  Z87.19 V12.79   3. Nasal septal deviation  J34.2 470   4. Nasal septal perforation  J34.89 478.19            Plan:      Avoid medicated nasal sprays in order to avoid irritation of mid nasal septum perforation. Use nasal saline 4-6 times a day for nasal septal perforation along with carlos med sinus rinses twice daily for sinus hygiene. Pt is to proceed with follow up with allergist. Pt is to follow up in clinic in 3 months to rescope to check mild adenoid hypertrophy. Pt advised about acid reflux changes seen upon laryngoscopy. Pt is to follow acid reflux handout as provided in AVS and advised to take omeprazole 40mg at night, which he does not take nightly. Avoid food for around 3 hours prior to bedtime and follow acid reflux handout as provided.

## 2022-07-21 NOTE — PATIENT INSTRUCTIONS
Make sure to take omeprazole 40mg at night, which he does not take nightly. Avoid food for around 3 hours prior to bedtime and follow acid reflux handout as provided.      ACID REFLUX   What is acid reflux?    When we eat, food passes from the throat and into the stomach through a tube called the esophagus. At the bottom of the esophagus is a ring of muscles that acts as a valve between the esophagus and stomach, called the lower esophageal sphincter. Smoking, alcohol, and certain types of food may weaken the sphincter, so it may stop closing properly. The contents in the stomach then may leak back, or reflux, into the esophagus. This problem is called gastroesophageal reflux disease (GERD). Symptoms of GERD include heartburn, belching, regurgitation of stomach contents, and swallowing difficulties.    Sometimes, the stomach acid travels up through the esophagus and spills into the larynx or pharynx (voice box). This is called laryngopharyngeal reflux (LPR) and is irritating to the vocal folds and surrounding tissues. Often, patients with LPR do not experience heartburn as a symptom. More commonly, symptoms of LPR include hoarseness, excessive mucous resulting in frequent throat clearing, post-nasal drip, coughing, throat soreness or burning, choking episodes, difficulty swallowing, and sensation of a lump in the throat.     How is acid reflux treated?   Treatment for acid reflux can involve any combination of medication, lifestyle modifications, and surgery.   Medications. Your doctor may prescribe a proton pump inhibitor (PPI) or an H2 blocker. If you are prescribed a PPI, take in on an empty stomach in the morning 30 minutes prior to eating breakfast. Keep in mind that it may take 4-6 weeks before symptoms begin to resolve, so do not stop medications without consulting your doctor.   Lifestyle and dietary modifications. Eat smaller meals at a slower pace. Avoid over-eating. If you are overweight, try to lose  weight. Do not lie down or exercise directly after eating; eat your last meal of the day at least 2-3 hours prior to going to sleep. Avoid tight-fitting clothes. If you are a smoker, reduce or quit smoking. Elevate your head of bed 4-6 inches by putting phone books under the legs at the head of your bed or buy a wedge pillow, but do not use more than two regular pillows as this causes the body to curl and compresses your stomach.     Food group Foods to avoid to reduce reflux   Beverages  Whole milk, 2% milk, chocolate milk/hot chocolate, alcohol, coffee (regular and decaf), caffeinated tea, mint tea, carbonated beverages, citrus juice    Breads/grains Commercial sweet rolls, doughnuts, croissants, and other high-fat pastries    Fruits and vegetables Fried or cream-style vegetables, tomatoes, tomato-based products, citrus fruits, hot peppers    Soups and seasonings Cream, cheese, tomato-based soups, vinegar    Meats and proteins Fatty or fried meat/fish, so, sausage, pepperoni, lunch meat, fried eggs    Fats and oil Lard, so drippings, salt pork, meat drippings, gravies, highly seasoned salad dressings, nuts    Sweets/desserts Anything made with or from chocolate, peppermint, spearmint, whole milk, or cream; high-fat pastries, gum, hard candy

## 2022-07-28 ENCOUNTER — PATIENT MESSAGE (OUTPATIENT)
Dept: PRIMARY CARE CLINIC | Facility: CLINIC | Age: 22
End: 2022-07-28

## 2022-07-28 ENCOUNTER — OFFICE VISIT (OUTPATIENT)
Dept: PRIMARY CARE CLINIC | Facility: CLINIC | Age: 22
End: 2022-07-28
Payer: MEDICAID

## 2022-07-28 DIAGNOSIS — J98.8 RESPIRATORY TRACT INFECTION DUE TO COVID-19 VIRUS: Primary | ICD-10-CM

## 2022-07-28 DIAGNOSIS — R19.7 DIARRHEA, UNSPECIFIED TYPE: ICD-10-CM

## 2022-07-28 DIAGNOSIS — U07.1 RESPIRATORY TRACT INFECTION DUE TO COVID-19 VIRUS: Primary | ICD-10-CM

## 2022-07-28 DIAGNOSIS — J40 BRONCHITIS: ICD-10-CM

## 2022-07-28 PROCEDURE — 1159F MED LIST DOCD IN RCRD: CPT | Mod: CPTII,95,, | Performed by: INTERNAL MEDICINE

## 2022-07-28 PROCEDURE — 1160F RVW MEDS BY RX/DR IN RCRD: CPT | Mod: CPTII,95,, | Performed by: INTERNAL MEDICINE

## 2022-07-28 PROCEDURE — 1159F PR MEDICATION LIST DOCUMENTED IN MEDICAL RECORD: ICD-10-PCS | Mod: CPTII,95,, | Performed by: INTERNAL MEDICINE

## 2022-07-28 PROCEDURE — 99214 OFFICE O/P EST MOD 30 MIN: CPT | Mod: 95,,, | Performed by: INTERNAL MEDICINE

## 2022-07-28 PROCEDURE — 99214 PR OFFICE/OUTPT VISIT, EST, LEVL IV, 30-39 MIN: ICD-10-PCS | Mod: 95,,, | Performed by: INTERNAL MEDICINE

## 2022-07-28 PROCEDURE — 1160F PR REVIEW ALL MEDS BY PRESCRIBER/CLIN PHARMACIST DOCUMENTED: ICD-10-PCS | Mod: CPTII,95,, | Performed by: INTERNAL MEDICINE

## 2022-07-28 RX ORDER — PREDNISONE 20 MG/1
20 TABLET ORAL 2 TIMES DAILY
Qty: 10 TABLET | Refills: 0 | Status: SHIPPED | OUTPATIENT
Start: 2022-07-28 | End: 2022-08-02

## 2022-07-28 RX ORDER — GUAIFENESIN 600 MG/1
600 TABLET, EXTENDED RELEASE ORAL 2 TIMES DAILY
Qty: 20 TABLET | Refills: 0 | Status: SHIPPED | OUTPATIENT
Start: 2022-07-28 | End: 2022-08-07

## 2022-07-28 RX ORDER — BENZONATATE 200 MG/1
200 CAPSULE ORAL 3 TIMES DAILY PRN
Qty: 30 CAPSULE | Refills: 0 | Status: SHIPPED | OUTPATIENT
Start: 2022-07-28 | End: 2022-08-07

## 2022-07-28 RX ORDER — AMOXICILLIN AND CLAVULANATE POTASSIUM 875; 125 MG/1; MG/1
1 TABLET, FILM COATED ORAL EVERY 12 HOURS
Qty: 20 TABLET | Refills: 0 | OUTPATIENT
Start: 2022-07-28 | End: 2022-09-11

## 2022-07-28 NOTE — PROGRESS NOTES
Subjective:    The patient location is: home  The chief complaint leading to consultation is: covid infection    Visit type: audio only    Face to Face time with patient: 12   minutes of total time spent on the encounter, which includes face to face time and non-face to face time preparing to see the patient (eg, review of tests), Obtaining and/or reviewing separately obtained history, Documenting clinical information in the electronic or other health record, Independently interpreting results (not separately reported) and communicating results to the patient/family/caregiver, or Care coordination (not separately reported).         Each patient to whom he or she provides medical services by telemedicine is:  (1) informed of the relationship between the physician and patient and the respective role of any other health care provider with respect to management of the patient; and (2) notified that he or she may decline to receive medical services by telemedicine and may withdraw from such care at any time.    Notes:    Patient ID: Erasto Wharton is a 22 y.o. male.    Chief Complaint: No chief complaint on file.    HPI    Pt visit today with c/o has been sick x 6 days and tested positive for covid he still ahving a lot of coughing with thick greenish phlegm and osb have to use his nebulizer and still having diarrhea no blood or mucous no abd pain no high fever body ache no n/v  Review of Systems    Objective:      Physical Exam   Pt is not in any acute distress has coughing hacking cough and mild sob  Assessment:       1. Respiratory tract infection due to COVID-19 virus    2. Bronchitis    3. Diarrhea, unspecified type        Plan:       Respiratory tract infection due to COVID-19 virus  Comments:  to ER if persistent fever or increase sob  Orders:  -     predniSONE (DELTASONE) 20 MG tablet; Take 1 tablet (20 mg total) by mouth 2 (two) times daily. for 5 days  Dispense: 10 tablet; Refill: 0    Bronchitis  -      amoxicillin-clavulanate 875-125mg (AUGMENTIN) 875-125 mg per tablet; Take 1 tablet by mouth every 12 (twelve) hours.  Dispense: 20 tablet; Refill: 0  -     predniSONE (DELTASONE) 20 MG tablet; Take 1 tablet (20 mg total) by mouth 2 (two) times daily. for 5 days  Dispense: 10 tablet; Refill: 0  -     guaiFENesin (MUCINEX) 600 mg 12 hr tablet; Take 1 tablet (600 mg total) by mouth 2 (two) times daily. for 10 days  Dispense: 20 tablet; Refill: 0  -     benzonatate (TESSALON) 200 MG capsule; Take 1 capsule (200 mg total) by mouth 3 (three) times daily as needed for Cough.  Dispense: 30 capsule; Refill: 0    Diarrhea, unspecified type  -     loperamide (IMODIUM) 2 mg capsule; Take 1 capsule (2 mg total) by mouth 4 (four) times daily as needed for Diarrhea.  Dispense: 30 capsule; Refill: 0        Medication List with Changes/Refills   New Medications    AMOXICILLIN-CLAVULANATE 875-125MG (AUGMENTIN) 875-125 MG PER TABLET    Take 1 tablet by mouth every 12 (twelve) hours.    BENZONATATE (TESSALON) 200 MG CAPSULE    Take 1 capsule (200 mg total) by mouth 3 (three) times daily as needed for Cough.    GUAIFENESIN (MUCINEX) 600 MG 12 HR TABLET    Take 1 tablet (600 mg total) by mouth 2 (two) times daily. for 10 days    LOPERAMIDE (IMODIUM) 2 MG CAPSULE    Take 1 capsule (2 mg total) by mouth 4 (four) times daily as needed for Diarrhea.    PREDNISONE (DELTASONE) 20 MG TABLET    Take 1 tablet (20 mg total) by mouth 2 (two) times daily. for 5 days   Current Medications    ALBUTEROL (PROVENTIL/VENTOLIN HFA) 90 MCG/ACTUATION INHALER    Inhale 2 puffs into the lungs every 4 (four) hours as needed for Shortness of Breath. Rescue    ALBUTEROL SULFATE 2.5 MG/0.5 ML NEBU    Take 2.5 mg by nebulization every 4 (four) hours as needed (for wheezing and SOB). Rescue    ALBUTEROL-IPRATROPIUM (DUO-NEB) 2.5 MG-0.5 MG/3 ML NEBULIZER SOLUTION    Take 3 mLs by nebulization every 6 (six) hours as needed for Wheezing. Rescue    CETIRIZINE (ZYRTEC)  10 MG TABLET    Take 1 tablet by mouth daily.    CLINDAMYCIN (CLEOCIN T) 1 % LOTION    clindamycin 1 % lotion   APPLY A THIN LAYER TO THE AFFECTED AREA(S) BY TOPICAL ROUTE 2 TIMES PER DAY    FLUTICASONE PROPIONATE (FLONASE) 50 MCG/ACTUATION NASAL SPRAY    SPRAY TWO SPRAYS INTO EACH NOSTRIL ONCE DAILY    METHYLPHENIDATE HCL 54 MG CR TABLET    Take 1 tablet (54 mg total) by mouth every morning.    MINOCYCLINE (MINOCIN,DYNACIN) 50 MG CAP    minocycline 50 mg capsule    OMEPRAZOLE (PRILOSEC) 40 MG CAPSULE    Take 1 capsule (40 mg total) by mouth once daily.    OMEPRAZOLE (PRILOSEC) 40 MG CAPSULE    Take 1 capsule (40 mg total) by mouth once daily.    ONDANSETRON (ZOFRAN) 4 MG TABLET    Take 1 tablet (4 mg total) by mouth 2 (two) times daily.   Discontinued Medications    DEXAMETHASONE (DECADRON) 4 MG TAB    Take 1 tablet (4 mg total) by mouth every 12 (twelve) hours.

## 2022-07-29 RX ORDER — LOPERAMIDE HYDROCHLORIDE 2 MG/1
2 CAPSULE ORAL 4 TIMES DAILY PRN
Qty: 30 CAPSULE | Refills: 0 | Status: SHIPPED | OUTPATIENT
Start: 2022-07-29 | End: 2022-08-08

## 2022-08-01 ENCOUNTER — PATIENT OUTREACH (OUTPATIENT)
Dept: EMERGENCY MEDICINE | Facility: HOSPITAL | Age: 22
End: 2022-08-01

## 2022-08-23 ENCOUNTER — PATIENT MESSAGE (OUTPATIENT)
Dept: PRIMARY CARE CLINIC | Facility: CLINIC | Age: 22
End: 2022-08-23
Payer: MEDICAID

## 2022-08-23 DIAGNOSIS — J30.89 NON-SEASONAL ALLERGIC RHINITIS, UNSPECIFIED TRIGGER: Primary | ICD-10-CM

## 2022-08-25 ENCOUNTER — TELEPHONE (OUTPATIENT)
Dept: PULMONOLOGY | Facility: CLINIC | Age: 22
End: 2022-08-25
Payer: MEDICAID

## 2022-08-25 NOTE — TELEPHONE ENCOUNTER
Spoke with patient advised we are not seeing new medicaid gave him the medicaid line to call to find in network provider.   ----- Message from Faustino Daly sent at 8/25/2022  3:43 PM CDT -----  Contact: self  Type: Sooner Appointment Request        Caller is requesting a sooner appointment. Caller declined first available appointment listed below. Caller will not accept being placed on the waitlist and is requesting a message be sent to doctor.        Name of Caller: Patient mom   When is the first available appointment? N/a  Symptoms: Patient has bad asthma   Best Call Back Number:[phone]  Additional Information: pt has a Ref from Dr. Matos just needs to get scheduled mondays and wednesdays are open. Plz call out to pt to get scheduled. Thanks

## 2022-09-08 ENCOUNTER — PATIENT MESSAGE (OUTPATIENT)
Dept: PRIMARY CARE CLINIC | Facility: CLINIC | Age: 22
End: 2022-09-08
Payer: MEDICAID

## 2022-09-08 ENCOUNTER — TELEPHONE (OUTPATIENT)
Dept: PULMONOLOGY | Facility: CLINIC | Age: 22
End: 2022-09-08
Payer: MEDICAID

## 2022-09-10 ENCOUNTER — PATIENT MESSAGE (OUTPATIENT)
Dept: OTOLARYNGOLOGY | Facility: CLINIC | Age: 22
End: 2022-09-10
Payer: MEDICAID

## 2022-09-11 ENCOUNTER — HOSPITAL ENCOUNTER (EMERGENCY)
Facility: HOSPITAL | Age: 22
Discharge: HOME | End: 2022-09-11
Attending: EMERGENCY MEDICINE
Payer: MEDICAID

## 2022-09-11 VITALS
TEMPERATURE: 99 F | BODY MASS INDEX: 34.86 KG/M2 | HEIGHT: 68 IN | SYSTOLIC BLOOD PRESSURE: 135 MMHG | DIASTOLIC BLOOD PRESSURE: 86 MMHG | OXYGEN SATURATION: 98 % | HEART RATE: 82 BPM | WEIGHT: 230 LBS | RESPIRATION RATE: 20 BRPM

## 2022-09-11 DIAGNOSIS — J01.01 ACUTE RECURRENT MAXILLARY SINUSITIS: Primary | ICD-10-CM

## 2022-09-11 PROCEDURE — 96372 THER/PROPH/DIAG INJ SC/IM: CPT | Performed by: EMERGENCY MEDICINE

## 2022-09-11 PROCEDURE — 25000003 PHARM REV CODE 250: Performed by: EMERGENCY MEDICINE

## 2022-09-11 PROCEDURE — 99284 EMERGENCY DEPT VISIT MOD MDM: CPT

## 2022-09-11 PROCEDURE — 63600175 PHARM REV CODE 636 W HCPCS: Performed by: EMERGENCY MEDICINE

## 2022-09-11 RX ORDER — AMOXICILLIN AND CLAVULANATE POTASSIUM 875; 125 MG/1; MG/1
1 TABLET, FILM COATED ORAL
Status: COMPLETED | OUTPATIENT
Start: 2022-09-11 | End: 2022-09-11

## 2022-09-11 RX ORDER — IBUPROFEN 800 MG/1
800 TABLET ORAL EVERY 6 HOURS PRN
Qty: 12 TABLET | Refills: 0 | Status: SHIPPED | OUTPATIENT
Start: 2022-09-11 | End: 2022-09-14

## 2022-09-11 RX ORDER — AMOXICILLIN AND CLAVULANATE POTASSIUM 875; 125 MG/1; MG/1
1 TABLET, FILM COATED ORAL 2 TIMES DAILY
Qty: 14 TABLET | Refills: 0 | Status: SHIPPED | OUTPATIENT
Start: 2022-09-11 | End: 2022-09-12 | Stop reason: SDUPTHER

## 2022-09-11 RX ORDER — DEXAMETHASONE SODIUM PHOSPHATE 4 MG/ML
8 INJECTION, SOLUTION INTRA-ARTICULAR; INTRALESIONAL; INTRAMUSCULAR; INTRAVENOUS; SOFT TISSUE
Status: COMPLETED | OUTPATIENT
Start: 2022-09-11 | End: 2022-09-11

## 2022-09-11 RX ORDER — KETOROLAC TROMETHAMINE 30 MG/ML
30 INJECTION, SOLUTION INTRAMUSCULAR; INTRAVENOUS
Status: COMPLETED | OUTPATIENT
Start: 2022-09-11 | End: 2022-09-11

## 2022-09-11 RX ADMIN — AMOXICILLIN AND CLAVULANATE POTASSIUM 1 TABLET: 875; 125 TABLET, FILM COATED ORAL at 12:09

## 2022-09-11 RX ADMIN — KETOROLAC TROMETHAMINE 30 MG: 30 INJECTION, SOLUTION INTRAMUSCULAR; INTRAVENOUS at 12:09

## 2022-09-11 RX ADMIN — DEXAMETHASONE SODIUM PHOSPHATE 8 MG: 4 INJECTION, SOLUTION INTRA-ARTICULAR; INTRALESIONAL; INTRAMUSCULAR; INTRAVENOUS; SOFT TISSUE at 12:09

## 2022-09-11 NOTE — ED PROVIDER NOTES
Encounter Date: 9/11/2022    SCRIBE #1 NOTE: I, Maura Lake, am scribing for, and in the presence of,  Adam Khan MD.     History     Chief Complaint   Patient presents with    Nose Problem     Pain swelling rt. Nostril      Time seen by provider: 12:10 PM on 09/11/2022    Erasto Wharton is a 22 y.o. male who presents to the ED with an onset of right sided facial pain, post nasal drip, and clear right nasal drainage that began 2 days ago. The patient was cleaning with chemicals 2 days ago and his nose became irritated. Patient was on Augmentin and steroids for sinusitis and they were completed recently. Patient took Tylenol, Motrin, and one of his mother's Augmentin yesterday to treat his symptoms. Patient is concerned of a sinus infection. The patient denies cough, nasal bleeding,  or any other symptoms at this time. He has a PMHx of asthma and no recorded PSHx.      The history is provided by the patient.   Review of patient's allergies indicates:   Allergen Reactions    Benzoyl peroxide Dermatitis, Hives, Itching, Rash and Swelling     No past medical history on file.  Past Surgical History:   Procedure Laterality Date    CHOLECYSTECTOMY      LAPAROSCOPIC CHOLECYSTECTOMY  08/27/2020    LAPAROSCOPIC CHOLECYSTECTOMY  8/27/2020    Procedure: CHOLECYSTECTOMY, LAPAROSCOPIC;  Surgeon: Gain Peterson MD;  Location: [facility] OR;  Service: General;;  [employer] video confirmed 8/21/dme    NASAL SINUS SURGERY  2003    Battery removed from nose    WISDOM TOOTH EXTRACTION       Family History   Problem Relation Age of Onset    Hypertension Mother     Heart disease Father     Lung disease Father     Asthma Father      Social History     Tobacco Use    Smoking status: Never    Smokeless tobacco: Never   Substance Use Topics    Alcohol use: Yes     Comment: occasionally     Drug use: Never     Review of Systems   Constitutional:  Negative for fever.   HENT:  Positive for postnasal drip and rhinorrhea. Negative for  congestion and nosebleeds.         Positive for facial pain.   Eyes:  Negative for visual disturbance.   Respiratory:  Negative for cough and wheezing.    Cardiovascular:  Negative for chest pain.   Gastrointestinal:  Negative for abdominal pain.   Genitourinary:  Negative for dysuria.   Musculoskeletal:  Negative for joint swelling.   Skin:  Negative for rash.   Neurological:  Negative for syncope.   Hematological:  Does not bruise/bleed easily.   Psychiatric/Behavioral:  Negative for confusion.      Physical Exam     Initial Vitals [09/11/22 1144]   BP Pulse Resp Temp SpO2   135/86 82 20 99.3 °F (37.4 °C) 98 %      MAP       --         Physical Exam    Nursing note and vitals reviewed.  Constitutional: Vital signs are normal. He appears well-nourished.   HENT:   Head: Normocephalic and atraumatic.   Eyes: Conjunctivae and EOM are normal.   Neck: Neck supple. No thyroid mass present.   Normal range of motion.  Cardiovascular:  Normal rate, regular rhythm and normal heart sounds.     Exam reveals no gallop and no friction rub.       No murmur heard.  Pulmonary/Chest: Breath sounds normal. He has no wheezes. He has no rhonchi. He has no rales.   Abdominal: Abdomen is soft. Bowel sounds are normal. There is no abdominal tenderness.   Musculoskeletal:      Cervical back: Normal range of motion and neck supple.     Neurological: He is alert and oriented to person, place, and time. He has normal strength. No cranial nerve deficit or sensory deficit.   Skin: Skin is warm and dry. No rash noted. No erythema.   Psychiatric: He has a normal mood and affect. His speech is normal. Cognition and memory are normal.       ED Course   Procedures  Labs Reviewed - No data to display       Imaging Results    None          Medications   dexamethasone injection 8 mg (8 mg Intramuscular Given 9/11/22 1228)   ketorolac injection 30 mg (30 mg Intramuscular Given 9/11/22 1228)   amoxicillin-clavulanate 875-125mg per tablet 1 tablet (1  tablet Oral Given 9/11/22 1228)     Medical Decision Making:   History:   Old Medical Records: I decided to obtain old medical records.  ED Management:  This patient was emergently assessed shortly after arrival.  Initial vital signs are stable.  He is alert nontoxic in appearance.  I do not think meningitis or cephalitis, CVST, other CNS infection, occult facial abscess.  History of complicated/recurrent sinusitis with improvement with Augmentin therapy in the past.  Currently no indication for advanced imaging or IV antibiotics but he will be restarted on Augmentin.  Provide anti-inflammatory and steroid dosing here.  He is asked to follow up with Otolaryngology as soon as possible or return to the ER for any new, concerning, or worsening symptoms.  Patient was agreeable and was discharged in stable condition.        Scribe Attestation:   Scribe #1: I performed the above scribed service and the documentation accurately describes the services I performed. I attest to the accuracy of the note.             I, Dr. Adam Khan, personally performed the services described in this documentation. All medical record entries made by the scribe were at my direction and in my presence.  I have reviewed the chart and agree that the record reflects my personal performance and is accurate and complete. Adam Khan MD.  5:18 PM 09/11/2022    Clinical Impression:   Final diagnoses:  [J01.01] Acute recurrent maxillary sinusitis (Primary)        ED Disposition Condition    Discharge Stable          ED Prescriptions       Medication Sig Dispense Start Date End Date Auth. Provider    amoxicillin-clavulanate 875-125mg (AUGMENTIN) 875-125 mg per tablet Take 1 tablet by mouth 2 (two) times daily. 14 tablet 9/11/2022 -- Adam Khan MD    ibuprofen (ADVIL,MOTRIN) 800 MG tablet Take 1 tablet (800 mg total) by mouth every 6 (six) hours as needed for Pain. 12 tablet 9/11/2022 9/14/2022 Adam Khan MD          Follow-up  Information       Follow up With Specialties Details Why Contact Info    Fahad Hart PA-C Otolaryngology Schedule an appointment as soon as possible for a visit   [address] [facility]  [address] [place] [id number]  [phone]               Adam Khan MD  09/11/22 9041

## 2022-09-11 NOTE — Clinical Note
"Erasto Wharton (Gabriel) was seen and treated in our emergency department on 9/11/2022.  He may return to work on 09/13/2022.       If you have any questions or concerns, please don't hesitate to call.      KRUNAL Yoder LPN"

## 2022-09-11 NOTE — ED NOTES
D/C instructions and Rx in pt possession along with belongings. No other needs at this time. Pt AAOx4, Abc's intact. NADN. No adverse reaction to medication given. Pt ambulatory to ED Lobby without assistance, steady gait.

## 2022-09-12 ENCOUNTER — OFFICE VISIT (OUTPATIENT)
Dept: PULMONOLOGY | Facility: CLINIC | Age: 22
End: 2022-09-12
Payer: MEDICAID

## 2022-09-12 VITALS
HEART RATE: 90 BPM | HEIGHT: 68 IN | OXYGEN SATURATION: 98 % | DIASTOLIC BLOOD PRESSURE: 76 MMHG | WEIGHT: 237.56 LBS | BODY MASS INDEX: 36.01 KG/M2 | SYSTOLIC BLOOD PRESSURE: 139 MMHG

## 2022-09-12 DIAGNOSIS — J82.83 EOSINOPHILIC ASTHMA: ICD-10-CM

## 2022-09-12 DIAGNOSIS — J30.89 NON-SEASONAL ALLERGIC RHINITIS, UNSPECIFIED TRIGGER: ICD-10-CM

## 2022-09-12 DIAGNOSIS — J45.50 SEVERE PERSISTENT ASTHMA WITHOUT COMPLICATION: Primary | ICD-10-CM

## 2022-09-12 DIAGNOSIS — R09.89 CHRONIC SINUS COMPLAINTS: ICD-10-CM

## 2022-09-12 DIAGNOSIS — K80.10 CALCULUS OF GALLBLADDER WITH CHOLECYSTITIS WITHOUT BILIARY OBSTRUCTION, UNSPECIFIED CHOLECYSTITIS ACUITY: ICD-10-CM

## 2022-09-12 PROCEDURE — 1159F PR MEDICATION LIST DOCUMENTED IN MEDICAL RECORD: ICD-10-PCS | Mod: CPTII,,, | Performed by: INTERNAL MEDICINE

## 2022-09-12 PROCEDURE — 3008F PR BODY MASS INDEX (BMI) DOCUMENTED: ICD-10-PCS | Mod: CPTII,,, | Performed by: INTERNAL MEDICINE

## 2022-09-12 PROCEDURE — 3078F DIAST BP <80 MM HG: CPT | Mod: CPTII,,, | Performed by: INTERNAL MEDICINE

## 2022-09-12 PROCEDURE — 99204 PR OFFICE/OUTPT VISIT, NEW, LEVL IV, 45-59 MIN: ICD-10-PCS | Mod: S$PBB,,, | Performed by: INTERNAL MEDICINE

## 2022-09-12 PROCEDURE — 1159F MED LIST DOCD IN RCRD: CPT | Mod: CPTII,,, | Performed by: INTERNAL MEDICINE

## 2022-09-12 PROCEDURE — 99999 PR PBB SHADOW E&M-EST. PATIENT-LVL IV: ICD-10-PCS | Mod: PBBFAC,,, | Performed by: INTERNAL MEDICINE

## 2022-09-12 PROCEDURE — 99214 OFFICE O/P EST MOD 30 MIN: CPT | Mod: PBBFAC,PO | Performed by: INTERNAL MEDICINE

## 2022-09-12 PROCEDURE — 99999 PR PBB SHADOW E&M-EST. PATIENT-LVL IV: CPT | Mod: PBBFAC,,, | Performed by: INTERNAL MEDICINE

## 2022-09-12 PROCEDURE — 3075F PR MOST RECENT SYSTOLIC BLOOD PRESS GE 130-139MM HG: ICD-10-PCS | Mod: CPTII,,, | Performed by: INTERNAL MEDICINE

## 2022-09-12 PROCEDURE — 3008F BODY MASS INDEX DOCD: CPT | Mod: CPTII,,, | Performed by: INTERNAL MEDICINE

## 2022-09-12 PROCEDURE — 99204 OFFICE O/P NEW MOD 45 MIN: CPT | Mod: S$PBB,,, | Performed by: INTERNAL MEDICINE

## 2022-09-12 PROCEDURE — 3078F PR MOST RECENT DIASTOLIC BLOOD PRESSURE < 80 MM HG: ICD-10-PCS | Mod: CPTII,,, | Performed by: INTERNAL MEDICINE

## 2022-09-12 PROCEDURE — 3075F SYST BP GE 130 - 139MM HG: CPT | Mod: CPTII,,, | Performed by: INTERNAL MEDICINE

## 2022-09-12 RX ORDER — AMOXICILLIN AND CLAVULANATE POTASSIUM 875; 125 MG/1; MG/1
1 TABLET, FILM COATED ORAL 2 TIMES DAILY
Qty: 20 TABLET | Refills: 3 | Status: SHIPPED | OUTPATIENT
Start: 2022-09-12 | End: 2022-10-27 | Stop reason: ALTCHOICE

## 2022-09-12 RX ORDER — PREDNISONE 20 MG/1
TABLET ORAL
Qty: 27 TABLET | Refills: 1 | Status: SHIPPED | OUTPATIENT
Start: 2022-09-12 | End: 2022-10-11 | Stop reason: ALTCHOICE

## 2022-09-12 RX ORDER — MONTELUKAST SODIUM 10 MG/1
10 TABLET ORAL NIGHTLY
Qty: 30 TABLET | Refills: 11 | Status: SHIPPED | OUTPATIENT
Start: 2022-09-12 | End: 2022-10-02 | Stop reason: SDUPTHER

## 2022-09-12 RX ORDER — FLUTICASONE PROPIONATE AND SALMETEROL XINAFOATE 230; 21 UG/1; UG/1
2 AEROSOL, METERED RESPIRATORY (INHALATION) 2 TIMES DAILY
Qty: 12 G | Refills: 11 | Status: SHIPPED | OUTPATIENT
Start: 2022-09-12 | End: 2023-06-08 | Stop reason: SDUPTHER

## 2022-09-12 RX ORDER — ALBUTEROL SULFATE 90 UG/1
2 AEROSOL, METERED RESPIRATORY (INHALATION) EVERY 4 HOURS PRN
Qty: 36 G | Refills: 11 | Status: SHIPPED | OUTPATIENT
Start: 2022-09-12 | End: 2022-12-07 | Stop reason: SDUPTHER

## 2022-09-12 RX ORDER — PANTOPRAZOLE SODIUM 40 MG/1
1 TABLET, DELAYED RELEASE ORAL EVERY MORNING
COMMUNITY
Start: 2022-08-01 | End: 2022-10-15

## 2022-09-12 RX ORDER — ALBUTEROL SULFATE 2.5 MG/.5ML
2.5 SOLUTION RESPIRATORY (INHALATION) EVERY 4 HOURS PRN
Qty: 120 EACH | Refills: 11 | Status: SHIPPED | OUTPATIENT
Start: 2022-09-12 | End: 2022-09-12

## 2022-09-12 NOTE — PROGRESS NOTES
9/12/2022    Erasto Wharton  New Patient Consult    Chief Complaint   Patient presents with    Asthma    Shortness of Breath       HPI: asthma all life, uses albuterol and nebs-- uses rescue 2/d with daily symptoms, went er within last yr with resp issues with severe bronchitis. Sinuses severe with sense smell and severe stuffy. Went to er yesterday right face swelling ppt steroids and amoxil  -- 3rd course abx last yr.      Uses zyrtec - was on flonase 2 each side daily--has nasal septal perforation seen  ent visit July 21,2022 and told to stop.   Pt has freq heartburn but no symptoms on omeprazole.  Pt had battery in nose 2003-- had to go to er-- watch battery had to pass into pharynx and remove from mouth.  .     Nocturnal arouses none.  Uses neb at bedtime.      Asa or nsaid not ppt flares.     Has eia.           PFSH:  No past medical history on file.      Past Surgical History:   Procedure Laterality Date    CHOLECYSTECTOMY      LAPAROSCOPIC CHOLECYSTECTOMY  08/27/2020    LAPAROSCOPIC CHOLECYSTECTOMY  8/27/2020    Procedure: CHOLECYSTECTOMY, LAPAROSCOPIC;  Surgeon: Gian Peterson MD;  Location: [facility] OR;  Service: General;;  [employer] video confirmed 8/21/dme    NASAL SINUS SURGERY  2003    Battery removed from nose    WISDOM TOOTH EXTRACTION       Social History     Tobacco Use    Smoking status: Never    Smokeless tobacco: Never   Substance Use Topics    Alcohol use: Yes     Comment: occasionally     Drug use: Never     Family History   Problem Relation Age of Onset    Hypertension Mother     Heart disease Father     Lung disease Father     Asthma Father      Review of patient's allergies indicates:   Allergen Reactions    Benzoyl peroxide Dermatitis, Hives, Itching, Rash and Swelling    Cat/feline products Shortness Of Breath    Grass pollen-june grass standard Shortness Of Breath          Review of Systems:  a review of eleven systems covering constitutional, Eye, HEENT, Psych, Respiratory, Cardiac,  "GI, , Musculoskeletal, Endocrine, Dermatologic was negative except for pertinent findings as listed ABOVE and below:  pertinent positive as above, rest is good       Exam:Comprehensive exam done. /76 (BP Location: Right arm, Patient Position: Sitting, BP Method: Medium (Automatic))   Pulse 90   Ht 5' 8" (1.727 m)   Wt 107.7 kg (237 lb 8.7 oz)   SpO2 98% Comment: on room air at rest  BMI 36.12 kg/m²   Exam included Vitals as listed, and patient's appearance and affect and alertness and mood, oral exam for yeast and hygiene and pharynx lesions and Mallapatti (M) score, neck with inspection for jvd and masses and thyroid abnormalities and lymph nodes (supraclavicular and infraclavicular nodes and axillary also examined and noted if abn), chest exam included symmetry and effort and fremitus and percussion and auscultation, cardiac exam included rhythm and gallops and murmur and rubs and jvd and edema, abdominal exam for mass and hepatosplenomegaly and tenderness and hernias and bowel sounds, Musculoskeletal exam with muscle tone and posture and mobility/gait and  strength, and skin for rashes and cyanosis and pallor and turgor, extremity for clubbing.  Findings were normal except for pertinent findings listed below:  M2, chest is symmetric, no distress, normal percussion, normal fremitus and good normal breath sounds       Radiographs (ct chest and cxr) reviewed: view by direct vision  jund 26, 2022 and 2020 cxr nad, no b ronchiectasis suggested.     Labs reviewed            PFT will be done and results to be reviewed       Plan:  Clinical impression is apparently straight forward and impression with management as below.    Erasto was seen today for asthma and shortness of breath.    Diagnoses and all orders for this visit:    Severe persistent asthma without complication  -     amoxicillin-clavulanate 875-125mg (AUGMENTIN) 875-125 mg per tablet; Take 1 tablet by mouth 2 (two) times daily.  -     " montelukast (SINGULAIR) 10 mg tablet; Take 1 tablet (10 mg total) by mouth every evening.  -     fluticasone-salmeterol 230-21 mcg/dose (ADVAIR HFA) 230-21 mcg/actuation HFAA inhaler; Inhale 2 puffs into the lungs 2 (two) times daily. Controller  -     predniSONE (DELTASONE) 20 MG tablet; Take 2 daily for 3 days then Take one daily for 3 days and may repeat for shortness of breath.  -     albuterol (PROVENTIL/VENTOLIN HFA) 90 mcg/actuation inhaler; Inhale 2 puffs into the lungs every 4 (four) hours as needed for Shortness of Breath or Wheezing. Rescue  -     albuterol sulfate 2.5 mg/0.5 mL Nebu; Take 2.5 mg by nebulization every 4 (four) hours as needed (for wheezing and SOB). Rescue  -     Complete PFT with bronchodilator; Future  -     CBC auto differential; Future    Non-seasonal allergic rhinitis, unspecified trigger  -     Ambulatory referral/consult to Pulmonology    Chronic sinus complaints  -     amoxicillin-clavulanate 875-125mg (AUGMENTIN) 875-125 mg per tablet; Take 1 tablet by mouth 2 (two) times daily.    Calculus of gallbladder with cholecystitis without biliary obstruction, unspecified cholecystitis acuity      No follow-ups on file.    Discussed with patient above for education the following:      Patient Instructions   Use controller advair 220 2 twice daily with spacer as controller. Also use Singulair for sinus and asthma    Use rescue with albuterol 2-3 puffs every 4 hrs as needed. Or nebulizer.    Use prednisone 20 mg 2 daily for 3 then one daily-- may get by with one for 3 days - action plan      May use augmentin for sinus infection.  May need cultures.      Check breathing test once cleared.   Check blood count for eosinophils-- if need prednisone twice in a yr.      Phone follow up in 3-6 months -- may need special shots.

## 2022-09-12 NOTE — PATIENT INSTRUCTIONS
Use controller advair 220 2 twice daily with spacer as controller. Also use Singulair for sinus and asthma    Use rescue with albuterol 2-3 puffs every 4 hrs as needed. Or nebulizer.    Use prednisone 20 mg 2 daily for 3 then one daily-- may get by with one for 3 days - action plan      May use augmentin for sinus infection.  May need cultures.      Check breathing test once cleared.   Check blood count for eosinophils-- if need prednisone twice in a yr.      Phone follow up in 3-6 months -- may need special shots.

## 2022-09-13 ENCOUNTER — OFFICE VISIT (OUTPATIENT)
Dept: OTOLARYNGOLOGY | Facility: CLINIC | Age: 22
End: 2022-09-13
Payer: MEDICAID

## 2022-09-13 VITALS — TEMPERATURE: 98 F | WEIGHT: 240.31 LBS | BODY MASS INDEX: 35.59 KG/M2 | HEIGHT: 69 IN

## 2022-09-13 DIAGNOSIS — J01.01 ACUTE RECURRENT MAXILLARY SINUSITIS: Primary | ICD-10-CM

## 2022-09-13 PROCEDURE — 1159F MED LIST DOCD IN RCRD: CPT | Mod: CPTII,,, | Performed by: PHYSICIAN ASSISTANT

## 2022-09-13 PROCEDURE — 1160F PR REVIEW ALL MEDS BY PRESCRIBER/CLIN PHARMACIST DOCUMENTED: ICD-10-PCS | Mod: CPTII,,, | Performed by: PHYSICIAN ASSISTANT

## 2022-09-13 PROCEDURE — 1159F PR MEDICATION LIST DOCUMENTED IN MEDICAL RECORD: ICD-10-PCS | Mod: CPTII,,, | Performed by: PHYSICIAN ASSISTANT

## 2022-09-13 PROCEDURE — 99213 OFFICE O/P EST LOW 20 MIN: CPT | Mod: S$PBB,,, | Performed by: PHYSICIAN ASSISTANT

## 2022-09-13 PROCEDURE — 3008F PR BODY MASS INDEX (BMI) DOCUMENTED: ICD-10-PCS | Mod: CPTII,,, | Performed by: PHYSICIAN ASSISTANT

## 2022-09-13 PROCEDURE — 99213 PR OFFICE/OUTPT VISIT, EST, LEVL III, 20-29 MIN: ICD-10-PCS | Mod: S$PBB,,, | Performed by: PHYSICIAN ASSISTANT

## 2022-09-13 PROCEDURE — 1160F RVW MEDS BY RX/DR IN RCRD: CPT | Mod: CPTII,,, | Performed by: PHYSICIAN ASSISTANT

## 2022-09-13 PROCEDURE — 99999 PR PBB SHADOW E&M-EST. PATIENT-LVL III: ICD-10-PCS | Mod: PBBFAC,,, | Performed by: PHYSICIAN ASSISTANT

## 2022-09-13 PROCEDURE — 99999 PR PBB SHADOW E&M-EST. PATIENT-LVL III: CPT | Mod: PBBFAC,,, | Performed by: PHYSICIAN ASSISTANT

## 2022-09-13 PROCEDURE — 3008F BODY MASS INDEX DOCD: CPT | Mod: CPTII,,, | Performed by: PHYSICIAN ASSISTANT

## 2022-09-13 PROCEDURE — 99213 OFFICE O/P EST LOW 20 MIN: CPT | Mod: PBBFAC,PO | Performed by: PHYSICIAN ASSISTANT

## 2022-09-13 NOTE — PROGRESS NOTES
[facility]    Subjective:      Patient: Erasto Wharton Patient PCP: Primary Doctor No         :  2000     Sex:  male      MRN:  [id number]          Date of Visit: 2022      Chief Complaint: Sinus follow up    Patient ID 2022: Erasto Wharton is a 22 y.o. male who was referred to me by Dr. Clinton Card in consultation for  sinus infection . Pt began having sore throat 1 week ago with sinus congestion that started 2 days later. [facility] and was told he had ph[facility]itis. Pt states that he was treated steroid shot, amoxicillin, and oral prednisone. Pt states that influenza and covid19 came back negative at [facility]. Pt states that throat symptoms were alleviated with treatment. Pt has not yet completed amoxicillin. Pt states that he has a throat tickle right now. Pt states he is having green rhinorrhea which is hard to get out he has had productive cough with green mucous. Pt had last episode of green productive cough morning. Pt states that he has had 2 sinus infections in the last month. Pt states that he is taking zyrtec daily and flonase 1 puff to each nostril once in them morning. Pt states that he has sinus congestion symptoms rather than true sinus pain/pressure. Pt denies fever/chills.     Pt states that he has issues with seasonal allergies. Pt states that his seasonal allergiees are worse during the summer/winter months. Pt denies history of allergy testing.     Interval History 2022: Pt was switched from amoxicillin to Augmentin 875mg BID x 10 days for sinusitis as he was having residual sinus issues with amoxicillin reported at last visit. Pt was advised at last visit that if he had residual sinus issues at follow up, then we may proceed with nasal endoscopy to further assess. Pt had environmental allergy blood testing completed 2022 that came back positive for silver birch, meadow grass, carlos eduardo grass, oak, elder grass, bermuda grass, dust mites,  and cat. Pt was advised through [facility] to use NeilMed sinus rinse two times daily. After clearing out nose, use flonase 1 puff to each nostril twice daily. Continue zyrtec daily. Referral to allergy was placed and pt has planned follow up with Allergist Dr. Maribell Grullonwork planned for 10/20/2022. Since last visit, pt was seen in ED 06/26/2022 and for shortness of breath. Pt has history of exercise induced asthma. Covid19 testing at ED was negative. Pt had CXR at ED that was unremarkable. Pt was started on tessalon perles at ED and provided nebulizer ampules for DuoNeb and was diagnosed with bronchitis. Pt presents today in office and states that he is no longer having shortness of breath or wheezing since his ED visit. Pt states today in office that his sinus issues have alleviated, but he states he has sore throat when he wakes up in the morning that goes away throughout the day. Pt had history of GERD and is on omeprazole 40mg at night, which he does not take nightly. Pt states that he gets acid reflux symptoms around once a week. Pt states that he no longer having nasal congestion, PND, or runny nose. Pt denies fever/chills or sinus pressure/pain. Pt's father has history of allergies and nasal polyps. Pt denies snoring or regular mouth breathing, or apneic episodes. Pt denies history of tonsillectomy/adenoidectomy. Pt denies chronic ear complaints.     Interval History 09/13/2022: Pt seen at ED 09/11/2022 with sinus complaints. Pt had complaints of right sided facial pain, post-nasal drip and clear nasal drainage. Pt given dexamethasone shote and prescribed Augmentin 875mg BID x 7 days. This is pt's 3rd sinus infection in the past 12 months. Pt states that right sided facial swelling has gone down. Pt has had nasal congestion on right side. Pt was having right cheek pain. Pt states that right cheek pain have been helped with augmentin. Pt denies fever/chills. Pt states that he has not yet completed  augmentin.     Past Medical History  He has no past medical history on file.    Family History  His family history includes Asthma in his father; Heart disease in his father; Hypertension in his mother; Lung disease in his father.    Past Surgical History:   Procedure Laterality Date    CHOLECYSTECTOMY      LAPAROSCOPIC CHOLECYSTECTOMY  08/27/2020    LAPAROSCOPIC CHOLECYSTECTOMY  8/27/2020    Procedure: CHOLECYSTECTOMY, LAPAROSCOPIC;  Surgeon: Gian Peterson MD;  Location: [facility];  Service: General;;  wil[employer] video confirmed 8/21/dme    NASAL SINUS SURGERY  2003    Battery removed from nose    WISDOM TOOTH EXTRACTION       Social History     Tobacco Use    Smoking status: Never    Smokeless tobacco: Never   Substance and Sexual Activity    Alcohol use: Yes     Comment: occasionally     Drug use: Never    Sexual activity: Yes     Partners: Female     Birth control/protection: OCP     Medications  He has a current medication list which includes the following prescription(s): albuterol, albuterol sulfate, amoxicillin-clavulanate 875-125mg, cetirizine, clindamycin, fluticasone propionate, advair hfa, ibuprofen, methylphenidate hcl, minocycline, montelukast, ondansetron, pantoprazole, and prednisone.    Review of patient's allergies indicates:   Allergen Reactions    Benzoyl peroxide Dermatitis, Hives, Itching, Rash and Swelling    Cat/feline products Shortness Of Breath    Grass pollen-anh grass standard Shortness Of Breath     All medications, allergies, and past history have been reviewed.    Objective:      Vitals:  Vitals - 1 value per visit 9/11/2022 9/12/2022 9/12/2022   SYSTOLIC 135 - 139   DIASTOLIC 86 - 76   Pulse 82 - 90   Temp 99.3 - -   Resp 20 - -   SPO2 98 - 98   Weight (lb) 230 - 237.55   Weight (kg) 104.327 - 107.75   Height 68 - 68   BMI (Calculated) 35 - 36.1   VISIT REPORT - - -   Pain Score  - 3 -       There is no height or weight on file to calculate BSA.  Physical Exam  Constitutional:        General: He is not in acute distress.     Appearance: Normal appearance. He is not ill-appearing.   HENT:      Head: Normocephalic and atraumatic.      Right Ear: Tympanic membrane, ear canal and external ear normal.      Left Ear: Tympanic membrane, ear canal and external ear normal.      Nose: Septal deviation (right) present.      Comments: Mid nasal septal perforation     Mouth/Throat:      Lips: Pink. No lesions.      Mouth: Mucous membranes are moist. No oral lesions.      Tongue: No lesions.      Palate: No lesions.      Pharynx: Oropharynx is clear. Uvula midline. No pharyngeal swelling, oropharyngeal exudate, posterior oropharyngeal erythema or uvula swelling.      Tonsils: No tonsillar exudate or tonsillar abscesses. 2+ on the right. 2+ on the left.   Eyes:      General:         Right eye: No discharge.         Left eye: No discharge.      Extraocular Movements: Extraocular movements intact.      Conjunctiva/sclera: Conjunctivae normal.   Pulmonary:      Effort: Pulmonary effort is normal.   Neurological:      General: No focal deficit present.      Mental Status: He is alert and oriented to person, place, and time. Mental status is at baseline.   Psychiatric:         Mood and Affect: Mood normal.         Behavior: Behavior normal.         Thought Content: Thought content normal.         Judgment: Judgment normal.     Labs:  WBC   Date Value Ref Range Status   07/30/2020 14.64 (H) 3.90 - 12.70 K/uL Final     Eosinophil %   Date Value Ref Range Status   07/30/2020 1.8 0.0 - 8.0 % Final     Eos #   Date Value Ref Range Status   07/30/2020 0.3 0.0 - 0.5 K/uL Final     Platelets   Date Value Ref Range Status   07/30/2020 282 150 - 350 K/uL Final     Glucose   Date Value Ref Range Status   07/30/2020 97 70 - 110 mg/dL Final     All lab results, imaging results, and data have been reviewed.    Assessment:        ICD-10-CM ICD-9-CM   1. Acute recurrent maxillary sinusitis  J01.01 461.0           Plan:       No sinus pain or pressure to palpation. Increase Augmentin 875mg twice daily to a total of 10 days. Continue carlos med sinus rinses twice daily. Use nasal saline spray 4-6 times a day for mid nasal septal perforation. If pt has 4th sinus infection, he is to come to the office and we will have him get CT sinus WO for further evaluation to see if sinus surgery would be an option.

## 2022-09-13 NOTE — PATIENT INSTRUCTIONS
Increase Augmentin 875mg twice daily for a total of 10 days.     Continue sinus rinses twice daily.     Avoid medicated nasal sprays due to nasal septal perforation. Use nasal saline 4-6 times a day to keep perforation hydrated so that it does not expand.     Follow up in 2 weeks for sinus recheck.

## 2022-09-22 ENCOUNTER — TELEPHONE (OUTPATIENT)
Dept: ALLERGY | Facility: CLINIC | Age: 22
End: 2022-09-22
Payer: MEDICAID

## 2022-09-22 ENCOUNTER — PATIENT MESSAGE (OUTPATIENT)
Dept: ALLERGY | Facility: CLINIC | Age: 22
End: 2022-09-22
Payer: MEDICAID

## 2022-09-27 ENCOUNTER — PATIENT MESSAGE (OUTPATIENT)
Dept: PRIMARY CARE CLINIC | Facility: CLINIC | Age: 22
End: 2022-09-27
Payer: MEDICAID

## 2022-09-27 ENCOUNTER — TELEPHONE (OUTPATIENT)
Dept: OTOLARYNGOLOGY | Facility: CLINIC | Age: 22
End: 2022-09-27
Payer: MEDICAID

## 2022-09-27 NOTE — TELEPHONE ENCOUNTER
Called pt. Advised that Mr. Hailey PA-C will not be in clinic today, we need to reschedule appt. Appt rescheduled to 10/12/22 at 0815. Thanks, Trinidad

## 2022-09-28 ENCOUNTER — PATIENT MESSAGE (OUTPATIENT)
Dept: OTOLARYNGOLOGY | Facility: CLINIC | Age: 22
End: 2022-09-28
Payer: MEDICAID

## 2022-10-10 ENCOUNTER — PATIENT MESSAGE (OUTPATIENT)
Dept: PRIMARY CARE CLINIC | Facility: CLINIC | Age: 22
End: 2022-10-10
Payer: MEDICAID

## 2022-10-10 ENCOUNTER — PATIENT MESSAGE (OUTPATIENT)
Dept: OTOLARYNGOLOGY | Facility: CLINIC | Age: 22
End: 2022-10-10
Payer: MEDICAID

## 2022-10-10 DIAGNOSIS — K21.9 GASTROESOPHAGEAL REFLUX DISEASE WITHOUT ESOPHAGITIS: Primary | ICD-10-CM

## 2022-10-10 DIAGNOSIS — J32.9 RECURRENT SINUSITIS: Primary | ICD-10-CM

## 2022-10-11 ENCOUNTER — OFFICE VISIT (OUTPATIENT)
Dept: ALLERGY | Facility: CLINIC | Age: 22
End: 2022-10-11
Payer: MEDICAID

## 2022-10-11 VITALS — BODY MASS INDEX: 35.23 KG/M2 | WEIGHT: 237.88 LBS | HEART RATE: 83 BPM | HEIGHT: 69 IN | OXYGEN SATURATION: 98 %

## 2022-10-11 DIAGNOSIS — J30.9 CHRONIC ALLERGIC RHINITIS: ICD-10-CM

## 2022-10-11 DIAGNOSIS — K21.9 LARYNGOPHARYNGEAL REFLUX (LPR): ICD-10-CM

## 2022-10-11 DIAGNOSIS — J32.9 FREQUENT EPISODES OF SINUSITIS: Primary | ICD-10-CM

## 2022-10-11 DIAGNOSIS — J30.1 NON-SEASONAL ALLERGIC RHINITIS DUE TO POLLEN: ICD-10-CM

## 2022-10-11 DIAGNOSIS — J30.1 SEASONAL ALLERGIC RHINITIS DUE TO POLLEN: ICD-10-CM

## 2022-10-11 DIAGNOSIS — J30.89 ALLERGIC RHINITIS DUE TO HOUSE DUST MITE: ICD-10-CM

## 2022-10-11 DIAGNOSIS — J34.89 NASAL SEPTAL PERFORATION: ICD-10-CM

## 2022-10-11 DIAGNOSIS — J45.50 SEVERE PERSISTENT ASTHMA WITHOUT COMPLICATION: ICD-10-CM

## 2022-10-11 PROCEDURE — 99999 PR PBB SHADOW E&M-EST. PATIENT-LVL IV: ICD-10-PCS | Mod: PBBFAC,,, | Performed by: STUDENT IN AN ORGANIZED HEALTH CARE EDUCATION/TRAINING PROGRAM

## 2022-10-11 PROCEDURE — 99999 PR PBB SHADOW E&M-EST. PATIENT-LVL IV: CPT | Mod: PBBFAC,,, | Performed by: STUDENT IN AN ORGANIZED HEALTH CARE EDUCATION/TRAINING PROGRAM

## 2022-10-11 PROCEDURE — 99205 OFFICE O/P NEW HI 60 MIN: CPT | Mod: S$PBB,,, | Performed by: STUDENT IN AN ORGANIZED HEALTH CARE EDUCATION/TRAINING PROGRAM

## 2022-10-11 PROCEDURE — 1160F PR REVIEW ALL MEDS BY PRESCRIBER/CLIN PHARMACIST DOCUMENTED: ICD-10-PCS | Mod: CPTII,,, | Performed by: STUDENT IN AN ORGANIZED HEALTH CARE EDUCATION/TRAINING PROGRAM

## 2022-10-11 PROCEDURE — 3008F PR BODY MASS INDEX (BMI) DOCUMENTED: ICD-10-PCS | Mod: CPTII,,, | Performed by: STUDENT IN AN ORGANIZED HEALTH CARE EDUCATION/TRAINING PROGRAM

## 2022-10-11 PROCEDURE — 99214 OFFICE O/P EST MOD 30 MIN: CPT | Mod: PBBFAC,PO | Performed by: STUDENT IN AN ORGANIZED HEALTH CARE EDUCATION/TRAINING PROGRAM

## 2022-10-11 PROCEDURE — 1159F PR MEDICATION LIST DOCUMENTED IN MEDICAL RECORD: ICD-10-PCS | Mod: CPTII,,, | Performed by: STUDENT IN AN ORGANIZED HEALTH CARE EDUCATION/TRAINING PROGRAM

## 2022-10-11 PROCEDURE — 1159F MED LIST DOCD IN RCRD: CPT | Mod: CPTII,,, | Performed by: STUDENT IN AN ORGANIZED HEALTH CARE EDUCATION/TRAINING PROGRAM

## 2022-10-11 PROCEDURE — 3008F BODY MASS INDEX DOCD: CPT | Mod: CPTII,,, | Performed by: STUDENT IN AN ORGANIZED HEALTH CARE EDUCATION/TRAINING PROGRAM

## 2022-10-11 PROCEDURE — 99205 PR OFFICE/OUTPT VISIT, NEW, LEVL V, 60-74 MIN: ICD-10-PCS | Mod: S$PBB,,, | Performed by: STUDENT IN AN ORGANIZED HEALTH CARE EDUCATION/TRAINING PROGRAM

## 2022-10-11 PROCEDURE — 1160F RVW MEDS BY RX/DR IN RCRD: CPT | Mod: CPTII,,, | Performed by: STUDENT IN AN ORGANIZED HEALTH CARE EDUCATION/TRAINING PROGRAM

## 2022-10-11 RX ORDER — AZELASTINE 1 MG/ML
2 SPRAY, METERED NASAL 2 TIMES DAILY PRN
Qty: 30 ML | Refills: 11 | Status: ON HOLD | OUTPATIENT
Start: 2022-10-11 | End: 2022-11-15 | Stop reason: HOSPADM

## 2022-10-11 RX ORDER — PSEUDOEPHEDRINE HCL 120 MG/1
120 TABLET, FILM COATED, EXTENDED RELEASE ORAL 2 TIMES DAILY PRN
Qty: 30 TABLET | Refills: 1 | Status: SHIPPED | OUTPATIENT
Start: 2022-10-11 | End: 2022-10-27

## 2022-10-11 NOTE — PATIENT INSTRUCTIONS
Testing  None        Treatment    Continue nasal rinses    When ENT say OK, restart Flonase OR add budesonide stroid to your rinses.    In the meantime...    AS NEEDED:  Astelin = azelastine nasal spray:   2 squirts each nostril as needed, up to twice a day   Do not snort (because it burns and tastes bad)    AT FIRST SIGN OF SINUS INFECTION:  Sudafed        1 tablet morning and 1 tablet midday

## 2022-10-11 NOTE — PROGRESS NOTES
Allergy Clinic Note  [facility]    This note was created by combination of typed  and M-Modal dictation. Transcription errors may be present.  If there are any questions, please contact me.    Subjective:      Patient ID: Erasto Wharton is a 22 y.o. male.    Chief Complaint: Allergies      Referring Provider: Fahad Hatr    History of Present Illness: Erasto Wharton is a 22 y.o. male with mild septal perforation and asthma referred from ENT for evaluation of recurrent sinusitis..    Related medications and other interventions  Advair -- started 9/12  Montelukast  Alb nebs or MDI prn --taking 2 to 3 times a day  Nasal rinses twice a day  Nasal saline to keep perforation moist about 4 times a day  Has Augmentin for self-directed use  (Pantaprozole)  (Flonase held at ENT d/t septal perforation)    10/11/2022:  At initial visit client reported he has little or no day-to-day symptoms, that his main problem is frequent sinus infections.  During infections he has right-sided nasal tenderness with visible swelling on the outside of his nose.  This is associated with complete blockage of his right nares, face pain, tooth pain and ear popping.  Since his last ENT visit when perforated nasal septum was noted, he is been taken off Flonase and is instead doing nasal rinses twice a day and keeping the area moist with nasal mist several times a day.  He is also taking montelukast on a daily basis.  He had allergy Immunocaps done in 2022 which show sensitivity to multiple grass pollens, oak pollen (early spring), birch pollen (late spring), with lesser reactions to dust mite.       MEDICAL HISTORY      Significant past medical history:  None  ENT surgery:  nasal sinus surgery 2003    Significant family history:  Father with rhinitis, asthma, and ABPA  Exposures:  3 dogs, 1 in the bed  Smoking Hx:  Client  reports that he has never smoked. He has never been exposed to tobacco smoke. He has never  used smokeless tobacco.      Asthma: Yes, followed at pulmonology  Eczema: no  Rhinitis: yes -- see HPI  Venom allergy: no  Latex allergy:  no    Patient Active Problem List   Diagnosis    Calculus of gallbladder    Non-seasonal allergic rhinitis    Severe persistent asthma without complication    Chronic sinus complaints    Eosinophilic asthma     Medication List with Changes/Refills   New Medications    AZELASTINE (ASTELIN) 137 MCG (0.1 %) NASAL SPRAY    2 sprays (274 mcg total) by Nasal route 2 (two) times daily as needed for Rhinitis. Donot snort (because it tastes bad)       Start Date: 10/11/2022End Date: 10/11/2023    PSEUDOEPHEDRINE (SUDAFED 12 HOUR) 120 MG 12 HR TABLET    Take 1 tablet (120 mg total) by mouth 2 (two) times daily as needed for Congestion (or head pressure.). Take one in AM and one midday.       Start Date: 10/11/2022End Date: --   Current Medications    ALBUTEROL (PROVENTIL/VENTOLIN HFA) 90 MCG/ACTUATION INHALER    Inhale 2 puffs into the lungs every 4 (four) hours as needed for Shortness of Breath or Wheezing. Rescue       Start Date: 9/12/2022 End Date: 9/12/2023    ALBUTEROL SULFATE 2.5 MG/0.5 ML NEBU    INHA E 1 VIAL BY NEBULIZATION EVERY 4 (FOUR) HOURS AS NEEDED (FOR WHEEZING AND SOB). RESCUE       Start Date: 9/12/2022 End Date: --    AMOXICILLIN-CLAVULANATE 875-125MG (AUGMENTIN) 875-125 MG PER TABLET    Take 1 tablet by mouth 2 (two) times daily.       Start Date: 9/12/2022 End Date: --    CETIRIZINE (ZYRTEC) 10 MG TABLET    Take 1 tablet by mouth daily.       Start Date: 6/2/2022  End Date: --    FLUTICASONE PROPIONATE (FLONASE) 50 MCG/ACTUATION NASAL SPRAY    SPRAY TWO SPRAYS INTO EACH NOSTRIL ONCE DAILY       Start Date: 7/22/2022 End Date: --    FLUTICASONE-SALMETEROL 230-21 MCG/DOSE (ADVAIR HFA) 230-21 MCG/ACTUATION HFAA INHALER    Inhale 2 puffs into the lungs 2 (two) times daily. Controller       Start Date: 9/12/2022 End Date: 9/12/2023    MONTELUKAST (SINGULAIR) 10 MG TABLET   "  Take 1 tablet (10 mg total) by mouth every evening.       Start Date: 10/3/2022 End Date: --    ONDANSETRON (ZOFRAN) 4 MG TABLET    Take 1 tablet (4 mg total) by mouth 2 (two) times daily.       Start Date: 8/27/2020 End Date: --    PANTOPRAZOLE (PROTONIX) 40 MG TABLET    Take 1 tablet by mouth every morning.       Start Date: 8/1/2022  End Date: 8/1/2023   Discontinued Medications    CLINDAMYCIN (CLEOCIN T) 1 % LOTION    clindamycin 1 % lotion   APPLY A THIN LAYER TO THE AFFECTED AREA(S) BY TOPICAL ROUTE 2 TIMES PER DAY       Start Date: --        End Date: 10/11/2022    METHYLPHENIDATE HCL 54 MG CR TABLET    Take 1 tablet (54 mg total) by mouth every morning.       Start Date: 11/30/2020End Date: 10/11/2022    MINOCYCLINE (MINOCIN,DYNACIN) 50 MG CAP    minocycline 50 mg capsule       Start Date: 6/23/2020 End Date: 10/11/2022    PREDNISONE (DELTASONE) 20 MG TABLET    Take 2 daily for 3 days then Take one daily for 3 days and may repeat for shortness of breath.       Start Date: 9/12/2022 End Date: 10/11/2022           REVIEW OF SYSTEMS      CONST: no F/C/NS, no unintentional weight changes  NEURO:  no tremor, no weakness, + HA  EYES: no discharge, + pruritus, no erythema  EARS: no hearing loss, no sensation of fullness  NOSE: +congestion, no rhinorrhea, + sneezing  PULM:  + SOB, no wheezing, no cough  CV: no CP, no palpitations, no leg swelling  GI:  no abdominal pain, no blood in stool  :  no dysuria, no hematuria  DERM: no rashes, no skin breaks    Objective:     Physical Exam:     Pulse 83   Ht 5' 9" (1.753 m)   Wt 107.9 kg (237 lb 14 oz)   SpO2 98%   BMI 35.13 kg/m²   GEN: Awake and alert, no distress  DERM: No rashes or flushing  EYE:  No occular discharge  HEENT: No nasal discharge, no hoarseness. TMs are clear bilaterally.  Nares are pink R nasal airway is nearly completely closed d/t swelling.  Mild to moderate swelling on L Oropharynx is benign without exudate.  Tongue is not coated.  PULM: Normal " work of breathing, no cough, CTA  CV:  RRR, pulse normal, cap refill < 2sec  NEURO:  No focal deficit, speech fluent and logical  PSYCH: appropriate affect, normal behavior        Allergy Testing      Immunocaps + gras, oak, birch > DM, 2022      Lab results            Imaging and other diagnostics    ENT endocopy, 2022  S/f Post cricoid edema, Post cricoid erythema              Complete PFT ordered but not yet scheduled    Medical records review    Reviewd ENT note of 9/13/2022 which documented nasal septal perforation     Reviewed pulmonology note of 9/12/2022   ASSESSMENT & PLAN       Diagnoses:     Erasto Wharton is a 22 y.o. male. with  1. Frequent episodes of sinusitis    2. Nasal septal perforation    3. Laryngopharyngeal reflux (LPR) by ENT endoscopyy    4. Chronic allergic rhinitis    5. Non-seasonal allergic rhinitis due to grass pollen    6. Seasonal allergic rhinitis due to tree pollen    7. Allergic rhinitis due to house dust mite    8. Severe persistent asthma without complication          Medical Decision making:   Mr Wharton is presenting with frequent sinusitis.  He has little or no daily rhinitis symptoms despite positive ImmunoCAP to grass, a few trees and dust mite.  There is nothing in his history to suggest immunodeficiency. His physical exam does show significant narrowing on the right due to turbinates swelling.  In most situations, topical steroids would be the treatment of choice.  Unfortunately, because of his nasal septal perforation, he was instructed at ENT not to use any steroid nose sprays or other topical steroids.  For the time being recommend azelastine nasal spray (topical antihistamine) for symptoms as needed.  I am also recommending Sudafed twice daily at the 1st sign of a sinus infection.  After his deviated nasal is healed, I think he would be an excellent candidate for budesonide rinses.    We discussed his allergic triggers briefly.  Discussed allergic treatment options:   Environmental, pharmacologic, and immune therapies.  In his case I recommend minimal dust avoidance measures and medications.      Frequent episodes of sinusitis  -     pseudoephedrine (SUDAFED 12 HOUR) 120 mg 12 hr tablet; Take 1 tablet (120 mg total) by mouth 2 (two) times daily as needed for Congestion (or head pressure.). Take one in AM and one midday.  Dispense: 30 tablet; Refill: 1    Nasal septal perforation  Continue to hold topical nasal steroids    Laryngopharyngeal reflux (LPR) by ENT endoscopyy  May be contributing to some rhinitis and/or some chest symptoms    Chronic allergic rhinitis  Non-seasonal allergic rhinitis due to grass pollen  Seasonal allergic rhinitis due to tree pollen  Allergic rhinitis due to house dust mite        -    dust avoidance measures  -     azelastine (ASTELIN) 137 mcg (0.1 %) nasal spray; 2 sprays (274 mcg total) by Nasal route 2 (two) times daily as needed for Rhinitis. Donot snort (because it tastes bad)  Dispense: 30 mL; Refill: 11    Severe persistent asthma without complication  Defer to pulmonology    Patient Instructions:     Patient Instructions   Testing  None        Treatment    Continue nasal rinses    When ENT say OK, restart Flonase OR add budesonide stroid to your rinses.    In the meantime...    AS NEEDED:  Astelin = azelastine nasal spray:   2 squirts each nostril as needed, up to twice a day   Do not snort (because it burns and tastes bad)    AT FIRST SIGN OF SINUS INFECTION:  Sudafed        1 tablet morning and 1 tablet midday    Follow up if symptoms worsen or fail to improve.        Ivon Raines MD  Allergy, Asthma and Immunology    I spent a total of 62 minutes on the day of the visit.This includes face to face time and non-face to face time preparing to see the patient (eg, review of tests), obtaining and/or reviewing separately obtained history, documenting clinical information in the electronic or other health record, independently interpreting results  and communicating results to the patient/family/caregiver, or care coordinator.

## 2022-10-12 ENCOUNTER — PATIENT OUTREACH (OUTPATIENT)
Dept: EMERGENCY MEDICINE | Facility: HOSPITAL | Age: 22
End: 2022-10-12

## 2022-10-12 NOTE — TELEPHONE ENCOUNTER
Laura let pt know he is on protonix it is the same as omeprazole he can't both at same time if he want to go back on omeprazole need to stop protonix they bot antiacid

## 2022-10-15 RX ORDER — OMEPRAZOLE 40 MG/1
40 CAPSULE, DELAYED RELEASE ORAL DAILY
Qty: 30 CAPSULE | Refills: 11 | Status: SHIPPED | OUTPATIENT
Start: 2022-10-15 | End: 2022-11-06 | Stop reason: SDUPTHER

## 2022-10-20 ENCOUNTER — HOSPITAL ENCOUNTER (OUTPATIENT)
Dept: RADIOLOGY | Facility: HOSPITAL | Age: 22
Discharge: HOME | End: 2022-10-20
Attending: PHYSICIAN ASSISTANT
Payer: MEDICAID

## 2022-10-20 DIAGNOSIS — J32.9 RECURRENT SINUSITIS: ICD-10-CM

## 2022-10-20 PROCEDURE — 70486 CT MAXILLOFACIAL W/O DYE: CPT | Mod: TC

## 2022-10-20 PROCEDURE — 70486 CT MAXILLOFACIAL W/O DYE: CPT | Mod: 26,,, | Performed by: RADIOLOGY

## 2022-10-20 PROCEDURE — 70486 CT MEDTRONIC SINUSES WITHOUT: ICD-10-PCS | Mod: 26,,, | Performed by: RADIOLOGY

## 2022-10-27 ENCOUNTER — PATIENT MESSAGE (OUTPATIENT)
Dept: OTOLARYNGOLOGY | Facility: CLINIC | Age: 22
End: 2022-10-27

## 2022-10-27 ENCOUNTER — OFFICE VISIT (OUTPATIENT)
Dept: OTOLARYNGOLOGY | Facility: CLINIC | Age: 22
End: 2022-10-27
Payer: MEDICAID

## 2022-10-27 VITALS — BODY MASS INDEX: 35.33 KG/M2 | HEIGHT: 69 IN | TEMPERATURE: 98 F | WEIGHT: 238.56 LBS

## 2022-10-27 DIAGNOSIS — Z91.09 ENVIRONMENTAL ALLERGIES: ICD-10-CM

## 2022-10-27 DIAGNOSIS — J34.89 NASAL OBSTRUCTION: ICD-10-CM

## 2022-10-27 DIAGNOSIS — J34.2 NASAL SEPTAL DEVIATION: Primary | ICD-10-CM

## 2022-10-27 DIAGNOSIS — J34.3 NASAL TURBINATE HYPERTROPHY: ICD-10-CM

## 2022-10-27 DIAGNOSIS — J34.89 NASAL SEPTAL PERFORATION: ICD-10-CM

## 2022-10-27 DIAGNOSIS — J01.01 ACUTE RECURRENT MAXILLARY SINUSITIS: ICD-10-CM

## 2022-10-27 PROCEDURE — 1160F PR REVIEW ALL MEDS BY PRESCRIBER/CLIN PHARMACIST DOCUMENTED: ICD-10-PCS | Mod: CPTII,,, | Performed by: OTOLARYNGOLOGY

## 2022-10-27 PROCEDURE — 1159F MED LIST DOCD IN RCRD: CPT | Mod: CPTII,,, | Performed by: OTOLARYNGOLOGY

## 2022-10-27 PROCEDURE — 1160F RVW MEDS BY RX/DR IN RCRD: CPT | Mod: CPTII,,, | Performed by: OTOLARYNGOLOGY

## 2022-10-27 PROCEDURE — 99213 OFFICE O/P EST LOW 20 MIN: CPT | Mod: PBBFAC,PO | Performed by: OTOLARYNGOLOGY

## 2022-10-27 PROCEDURE — 99999 PR PBB SHADOW E&M-EST. PATIENT-LVL III: CPT | Mod: PBBFAC,,, | Performed by: OTOLARYNGOLOGY

## 2022-10-27 PROCEDURE — 1159F PR MEDICATION LIST DOCUMENTED IN MEDICAL RECORD: ICD-10-PCS | Mod: CPTII,,, | Performed by: OTOLARYNGOLOGY

## 2022-10-27 PROCEDURE — 99999 PR PBB SHADOW E&M-EST. PATIENT-LVL III: ICD-10-PCS | Mod: PBBFAC,,, | Performed by: OTOLARYNGOLOGY

## 2022-10-27 PROCEDURE — 99214 OFFICE O/P EST MOD 30 MIN: CPT | Mod: S$PBB,,, | Performed by: OTOLARYNGOLOGY

## 2022-10-27 PROCEDURE — 99214 PR OFFICE/OUTPT VISIT, EST, LEVL IV, 30-39 MIN: ICD-10-PCS | Mod: S$PBB,,, | Performed by: OTOLARYNGOLOGY

## 2022-10-27 NOTE — PATIENT INSTRUCTIONS
Lots of saline sprays and even a gentle sinus rinse.  Hold off on the Astelin which is likely worsening nosebleeds.  We will restart nasal steroids after we complete surgery.  Consideration of immunotherapy is also recommended.      We will proceed with surgical treatment of septoplasty with repair of septal perforation (this appears to be related to childhood battery exposure), turbinate reduction (S), and bilateral maxillary sinus balloon dilation for recurrent by lateral maxillary sinusitis.  Patient understands all risks, benefits, limitations and alternatives.  Specifically made aware of the risk including but not limited bleeding, persistent perforation, persistent nasal obstruction, worsening sinus infections needing more surgery, infection and nasal deformity    Postop 13 days after surgery for splint removal.  Scripts at surgery.      INSTRUCTIONS TO FOLLOW AFTER SINUS AND NASAL SURGERY  DR. SCHNEIDER - [facility]    Office hours:  Weekdays 8:00 am to 5:00 pm.  Please call [phone] and ask to speak with his nurse or medical assistant.    After-hours & weekends:  Please call [phone] and ask to speak with Dr. Irvin or the ENT physician on call.    Your first office visit with Dr. Irvin after surgery should have been already scheduled.  If you dont know when it is, call Dr. Villarreal nurse or medical assistant at [phone].    Please call immediately if you have:    Temperature of 101° F or greater  Any unusual, painful swelling  Any active bleeding that saturates more than a 4x4 gauze  Any thick drainage green or yellow drainage  Changes in vision or swelling around the eye  Pain not relieved by your prescribed pain medication    ACTIVITY:    Sleep on your back with the head of the bead elevated, up on 2-3 pillows, or in a recliner for the first 3 to 5 days. This will help with swelling.     After surgery you may have a lot of nasal drainage. This is normal. You may breathe through  your nose if youre able but avoid inhaling forcibly. Let all drainage fall on your mustache dressing and change it as needed.    You may wake up after surgery with thick white stockings on. Wear them until you are walking around more. It is important to walk around often while at home to keep your blood circulating and prevent blood clots.    If you use CPAP or BiPAP to sleep at night, you should wait at least 48 hours before resuming use. Dr. Irvin will advise you when it is safe to do this.    You may shower 24 hours after surgery.    RESTRICTED ACTIVITIES AFTER SURGERY:    DO NOT blow your nose for 2 weeks. The only exception is that you may lightly blow your nose after using the sinus rinse. If you have to sneeze or cough, do so with your mouth open.     AVOID all heavy lifting, straining or bending for 2 weeks.     AVOID any sexual activity for 2 weeks after surgery.    AVOID semi-contact sports or vigorous exercising for 3-4 weeks. Dr. Irvin will let you know when you are cleared to resume exercise.    AVOID flying or swimming for 2 weeks.      DO NOT operate a motor vehicle or any type of heavy machinery within 24 hours of taking prescription pain medication.    DO NOT smoke or be around smokers.    AVOID irritating substances that might make you sneeze, such as dust, chalk, harsh chemicals, and allergic triggers. This might also include spicy foods.    DRESSINGS:    Change the gauze mustache dressing under your nose as needed. (If unsure what this dressing is or how to do this, ask your doctor or nurse before you leave the hospital.) You may have pinkish-red drainage for 2-3 days.    Usually there is no gauze packing placed inside the nose.  If packing is necessary, you will be informed by your surgeon.  Do not touch or pull at the packing. The packing will be removed by your doctor at your first visit after surgery.     You may also have a dissolvable stent or dissolvable sponge placed into the  sinuses during surgery.  These usually do not need to be removed unless you are told otherwise by Dr. Irvin.  You may notice small fragments of these items come out of your nose in the weeks following surgery.    MEDICATIONS:    After surgery, you will be sent home with prescriptions for pain medication and an anti-nausea medication. Antibiotics are usually not necessary.    Most people need pain medication for the first few days after surgery, although a narcotic is rarely necessary. The best pain control comes from a combination of ACETAMINOPHEN (Tylenol) and IBUPROFEN (Motrin). You will be given prescriptions for these at the recommended dose. These can be alternated so that you are taking something every 2 or 3 hours.    Some people have problems with bowel movements after surgery. If you have NOT had a bowel movement 3-5 days after surgery, go to your local pharmacy and purchase an over the counter stool softener such as COLACE. You can also ask the pharmacist for his or her recommendation. If you still do not have a bowel movement after starting the softener, please call the office.    You may be given an ointment called MUPIROCIN to use after surgery if you also had septal surgery. If you are given this, use a cotton swab to apply gently inside the nostrils. Do this 2 times a day for 1 week.    You will need these over-the-counter medications after surgery:    SALINE SINUS RINSE (Maur[employer] Med brand): You will use this to rinse out your nose and sinuses after surgery. Begin doing this the day after surgery, unless instructed otherwise by Dr. Irvin.  You should do this 2 times a day, following the instructions on the box.    AFRIN (regular strength): Only use if you have nasal congestion or bleeding. Use 2 times per day for 3 days, stop for 1 day, continue 2 times per day for 3 days, AND NO MORE, then stop completely.  NOTE:  You may not need to do this at all.    DIET:    Avoid hot and spicy foods for 1  week after surgery.    Begin with bland foods the evening after surgery and advance to your regular diet as tolerated. It is not necessary to take only soft food unless you are recovering from tonsil surgery.    Drink plenty of fluids (water is best).     Avoid alcoholic and caffeinated beverages for 1 week after surgery(unless you are a habitual drinker at risk of withdrawal) because they can cause you to become dehydrated.

## 2022-10-27 NOTE — PROGRESS NOTES
[facility]    Subjective:      Patient: Erasto Wharton Patient PCP: Primary Doctor No         :  2000     Sex:  male      MRN:  [id number]          Date of Visit: 10/27/2022      Chief Complaint: Sinusitis (Follow up on Sinusitis. Saw Mr. OlsenterLORRI on 22. CT Sinus was done 10/20/22. States symptoms come and go. Here to discuss treatment options. )      Patient ID: Erasto Wharton is a 22 y.o. male lifelong NON-smoker with severe eosinophilic asthma and allergic rhinitis self-referred for sinusitis.  Three rounds of antibiotics in  by September    See by Vimal Hart 22 notes reviewed.  No cultures.  Endoscopy performed at 2022 does not mention perforation.  Patient reports he did put a disc battery in his nose as a child smell burning.  He did start having some nosebleed but only after he started the Astelin.    CT 22 reviewed.shows septal perforation large swell bodies deviated residual septum to the right side.  Bilateral significant ostiomeatal complex narrowing without obstruction.  Large adenoid tissue.  Normal middle ears and mastoids.  No effusion or destructive process.  Well developed well pneumatized    Allergy testing / consult with Dr. Vazquez.  ImmunoCAP testing in  with sensitivity to multiple grass pollens, early spring oak pollen, late spring birch pollen and lesser reactions to dust mites    Pulmonary evaluation with Dr. Donnelly 2022 notes reviewed recommended continued use of Advair with rescue albuterol treated with 40 mg prednisone taper at that time      No results found for: IGE     Eos #   Date Value Ref Range Status   2020 0.3 0.0 - 0.5 K/uL Final                Review of Systems   Constitutional: Negative.    HENT:  Positive for congestion, postnasal drip, rhinorrhea, sinus pressure, sinus pain and sneezing. Negative for nosebleeds.    Eyes: Negative.    Respiratory: Negative.     Cardiovascular: Negative.    Gastrointestinal: Negative.     Endocrine: Negative.    Genitourinary: Negative.    Musculoskeletal: Negative.    Skin: Negative.    Allergic/Immunologic: Negative.    Neurological: Negative.    Hematological: Negative.    Psychiatric/Behavioral: Negative.        Past Medical History  He has no past medical history on file.    Family / Surgical / Social History  His family history includes Asthma in his father; Heart disease in his father; Hypertension in his mother; Lung disease in his father.    Past Surgical History:   Procedure Laterality Date    CHOLECYSTECTOMY      LAPAROSCOPIC CHOLECYSTECTOMY  08/27/2020    LAPAROSCOPIC CHOLECYSTECTOMY  8/27/2020    Procedure: CHOLECYSTECTOMY, LAPAROSCOPIC;  Surgeon: Gian Peterson MD;  Location: [facility] OR;  Service: General;;  Zoe Majeste video confirmed 8/21/dme    NASAL SINUS SURGERY  2003    Battery removed from nose    WISDOM TOOTH EXTRACTION         Social History     Tobacco Use    Smoking status: Never     Passive exposure: Never    Smokeless tobacco: Never   Substance and Sexual Activity    Alcohol use: Yes     Comment: occasionally     Drug use: Never    Sexual activity: Yes     Partners: Female     Birth control/protection: OCP       Medications  He has a current medication list which includes the following prescription(s): albuterol, albuterol sulfate, azelastine, advair hfa, montelukast, omeprazole, and cetirizine.      Allergies  Review of patient's allergies indicates:   Allergen Reactions    Benzoyl peroxide Dermatitis, Hives, Itching, Rash and Swelling    Cat/feline products Shortness Of Breath    Grass pollen-anh grass standard Shortness Of Breath       All medications, allergies, and past history have been reviewed.    Objective:      Vitals:  Vitals - 1 value per visit 10/11/2022 10/27/2022 10/27/2022   SYSTOLIC - - -   DIASTOLIC - - -   Pulse 83 - -   Temp - - 98.1   Resp - - -   SPO2 98 - -   Weight (lb) 237.88 - 238.54   Weight (kg) 107.9 - 108.2   Height 69 - 69   BMI  (Calculated) 35.1 - 35.2   VISIT REPORT - - -   Pain Score  - 0 -       Body surface area is 2.3 meters squared.    Physical Exam:    GENERAL  APPEARANCE -  alert, appears stated age, and cooperative  BARRIER(S) TO COMMUNICATION -  none VOICE - appropriate for age and gender    INTEGUMENTARY  no suspicious head and neck lesions    HEENT  HEAD: Normocephalic, without obvious abnormality, atraumatic  FACE: INSPECTION - Symmetric, no signs of trauma, no suspicious lesion(s)  PALPATION -  No masses SALIVARY GLANDS - non-tender with no appreciable mass  STRENGTH - facial symmetry  NECK/THYROID: normal atraumatic, no neck masses, normal thyroid, no jvd    EYES  Normal occular alignment and mobility with no visible nystagmus at rest    EARS/NOSE/MOUTH/THROAT  EARS  PINNAE AND EXTERNAL EARS - no suspicious lesion OTOSCOPIC EXAM (surgical microscopy was not used for visualization/instrumentation): EAR EXAM - Normal ear canals, tympanic membranes and mobility, and middle ear spaces bilaterally.  HEARING - grossly intact to voice/finger rub    NOSE AND SINUSES  EXTERNAL NOSE - midline, no lack of support  SEPTUM - obstructive right deviation, perforation, dry no suspicious tissue, < 1 cm noted from left TURBINATES - left 3+ MUCOSA - mild congestion     MOUTH AND THROAT   ORAL CAVITY, LIPS, TEETH, GUMS & TONGUE - moist, no suspicious lesions  OROPHARYNX /TONSILS/PHARYNGEAL WALLS/HYPOPHARYNX - no erythema or exudates  NASOPHARYNX - limited mirror exam - unable to visualize due to anatomy/gag  LARYNX -  - limited mirror exam - unable to visualize due to anatomy/gag      CHEST AND LUNG   INSPECTION & AUSCULTATION - normal effort, no stridor    CARDIOVASCULAR  AUSCULTATION & PERIPHERAL VASCULAR - regular rate and rhythm.    NEUROLOGIC  MENTAL STATUS - alert, interactive CRANIAL NERVES - normal    LYMPHATIC  HEAD AND NECK - non-palpable; SUPRACLAVICULAR - deferred; AXILLARY - deferred; INGUINAL - deferred; LIVER/SPLEEN -  deferred        Procedure(s):  None    Labs:  WBC   Date Value Ref Range Status   07/30/2020 14.64 (H) 3.90 - 12.70 K/uL Final     Hemoglobin   Date Value Ref Range Status   07/30/2020 15.2 14.0 - 18.0 g/dL Final     Platelets   Date Value Ref Range Status   07/30/2020 282 150 - 350 K/uL Final     Creatinine   Date Value Ref Range Status   07/30/2020 1.2 0.5 - 1.4 mg/dL Final     Glucose   Date Value Ref Range Status   07/30/2020 97 70 - 110 mg/dL Final         Assessment:      Problem List Items Addressed This Visit    None  Visit Diagnoses       Nasal septal deviation    -  Primary    Acute recurrent maxillary sinusitis        Nasal septal perforation        Nasal obstruction        Nasal turbinate hypertrophy        Environmental allergies                     Plan:      Lots of saline sprays and even a gentle sinus rinse.  Hold off on the Astelin which is likely worsening nosebleeds.  We will restart nasal steroids after we complete surgery.  Consideration of immunotherapy is also recommended.      We will proceed with surgical treatment of septoplasty with repair of septal perforation (this appears to be related to childhood battery exposure), turbinate reduction (S), and bilateral maxillary sinus balloon dilation for recurrent by lateral maxillary sinusitis.  Patient understands all risks, benefits, limitations and alternatives.  Specifically made aware of the risk including but not limited bleeding, persistent perforation, persistent nasal obstruction, worsening sinus infections needing more surgery, infection and nasal deformity    Postop 13 days after surgery for splint removal.  Scripts at surgery.

## 2022-11-01 ENCOUNTER — PATIENT MESSAGE (OUTPATIENT)
Dept: OTOLARYNGOLOGY | Facility: CLINIC | Age: 22
End: 2022-11-01
Payer: MEDICAID

## 2022-11-14 ENCOUNTER — ANESTHESIA EVENT (OUTPATIENT)
Dept: SURGERY | Facility: AMBULARY SURGERY CENTER | Age: 22
End: 2022-11-14
Payer: MEDICAID

## 2022-11-15 ENCOUNTER — HOSPITAL ENCOUNTER (OUTPATIENT)
Facility: AMBULARY SURGERY CENTER | Age: 22
Discharge: HOME | End: 2022-11-15
Attending: OTOLARYNGOLOGY | Admitting: OTOLARYNGOLOGY
Payer: MEDICAID

## 2022-11-15 ENCOUNTER — ANESTHESIA (OUTPATIENT)
Dept: SURGERY | Facility: AMBULARY SURGERY CENTER | Age: 22
End: 2022-11-15
Payer: MEDICAID

## 2022-11-15 DIAGNOSIS — J34.89 NASAL SEPTAL PERFORATION: ICD-10-CM

## 2022-11-15 PROCEDURE — 30630 PR REPAIR NASAL SEPTUM PERFORATN: ICD-10-PCS | Mod: 51,,, | Performed by: OTOLARYNGOLOGY

## 2022-11-15 PROCEDURE — 31256 PR NASAL/SINUS ENDOSCOPY,OPEN MAXILL SINUS: ICD-10-PCS | Mod: 50,51,, | Performed by: OTOLARYNGOLOGY

## 2022-11-15 PROCEDURE — 30802 ABLATE INF TURBINATE SUBMUC: CPT | Mod: 51,,, | Performed by: OTOLARYNGOLOGY

## 2022-11-15 PROCEDURE — 30520 REPAIR OF NASAL SEPTUM: CPT | Mod: ,,, | Performed by: OTOLARYNGOLOGY

## 2022-11-15 PROCEDURE — 31276 NSL/SINS NDSC FRNT TISS RMVL: CPT | Mod: LT | Performed by: OTOLARYNGOLOGY

## 2022-11-15 PROCEDURE — 30520 PR REPAIR, NASAL SEPTUM: ICD-10-PCS | Mod: ,,, | Performed by: OTOLARYNGOLOGY

## 2022-11-15 PROCEDURE — 88311 DECALCIFY TISSUE: CPT | Mod: 26,,, | Performed by: PATHOLOGY

## 2022-11-15 PROCEDURE — 88305 TISSUE EXAM BY PATHOLOGIST: ICD-10-PCS | Mod: 26,,, | Performed by: PATHOLOGY

## 2022-11-15 PROCEDURE — 88305 TISSUE EXAM BY PATHOLOGIST: CPT | Mod: 26,,, | Performed by: PATHOLOGY

## 2022-11-15 PROCEDURE — 30802 ABLATE INF TURBINATE SUBMUC: CPT | Performed by: OTOLARYNGOLOGY

## 2022-11-15 PROCEDURE — D9220A PRA ANESTHESIA: ICD-10-PCS | Mod: CRNA,,, | Performed by: NURSE ANESTHETIST, CERTIFIED REGISTERED

## 2022-11-15 PROCEDURE — D9220A PRA ANESTHESIA: Mod: ANES,,, | Performed by: ANESTHESIOLOGY

## 2022-11-15 PROCEDURE — 30520 REPAIR OF NASAL SEPTUM: CPT | Performed by: OTOLARYNGOLOGY

## 2022-11-15 PROCEDURE — 30802 PR RF ABLATION INF TURBINATE SUBMUCOSAL: ICD-10-PCS | Mod: 51,,, | Performed by: OTOLARYNGOLOGY

## 2022-11-15 PROCEDURE — 31256 EXPLORATION MAXILLARY SINUS: CPT | Mod: 50,51,, | Performed by: OTOLARYNGOLOGY

## 2022-11-15 PROCEDURE — 88311 PR  DECALCIFY TISSUE: ICD-10-PCS | Mod: 26,,, | Performed by: PATHOLOGY

## 2022-11-15 PROCEDURE — 30630 REPAIR NASAL SEPTUM DEFECT: CPT | Performed by: OTOLARYNGOLOGY

## 2022-11-15 PROCEDURE — D9220A PRA ANESTHESIA: Mod: CRNA,,, | Performed by: NURSE ANESTHETIST, CERTIFIED REGISTERED

## 2022-11-15 PROCEDURE — 31256 EXPLORATION MAXILLARY SINUS: CPT | Mod: RT | Performed by: OTOLARYNGOLOGY

## 2022-11-15 PROCEDURE — 30630 REPAIR NASAL SEPTUM DEFECT: CPT | Mod: 51,,, | Performed by: OTOLARYNGOLOGY

## 2022-11-15 PROCEDURE — 30140 RESECT INFERIOR TURBINATE: CPT | Mod: RT | Performed by: OTOLARYNGOLOGY

## 2022-11-15 PROCEDURE — D9220A PRA ANESTHESIA: ICD-10-PCS | Mod: ANES,,, | Performed by: ANESTHESIOLOGY

## 2022-11-15 RX ORDER — LIDOCAINE HYDROCHLORIDE AND EPINEPHRINE 10; 10 MG/ML; UG/ML
INJECTION, SOLUTION INFILTRATION; PERINEURAL
Status: DISCONTINUED | OUTPATIENT
Start: 2022-11-15 | End: 2022-11-15 | Stop reason: HOSPADM

## 2022-11-15 RX ORDER — EPINEPHRINE 1 MG/ML
INJECTION, SOLUTION, CONCENTRATE INTRAVENOUS
Status: DISCONTINUED | OUTPATIENT
Start: 2022-11-15 | End: 2022-11-15 | Stop reason: HOSPADM

## 2022-11-15 RX ORDER — EPINEPHRINE 1 MG/ML
INJECTION INTRAMUSCULAR; INTRAVENOUS; SUBCUTANEOUS
Status: DISCONTINUED
Start: 2022-11-15 | End: 2022-11-15 | Stop reason: HOSPADM

## 2022-11-15 RX ORDER — LIDOCAINE HYDROCHLORIDE 10 MG/ML
INJECTION, SOLUTION EPIDURAL; INFILTRATION; INTRACAUDAL; PERINEURAL
Status: DISCONTINUED
Start: 2022-11-15 | End: 2022-11-15 | Stop reason: HOSPADM

## 2022-11-15 RX ORDER — OXYCODONE HYDROCHLORIDE 5 MG/1
5 TABLET ORAL
Status: DISCONTINUED | OUTPATIENT
Start: 2022-11-15 | End: 2022-11-15 | Stop reason: HOSPADM

## 2022-11-15 RX ORDER — HYDROMORPHONE HYDROCHLORIDE 1 MG/ML
0.2 INJECTION, SOLUTION INTRAMUSCULAR; INTRAVENOUS; SUBCUTANEOUS EVERY 5 MIN PRN
Status: DISCONTINUED | OUTPATIENT
Start: 2022-11-15 | End: 2022-11-15 | Stop reason: HOSPADM

## 2022-11-15 RX ORDER — ROCURONIUM BROMIDE 10 MG/ML
INJECTION, SOLUTION INTRAVENOUS
Status: DISCONTINUED | OUTPATIENT
Start: 2022-11-15 | End: 2022-11-15

## 2022-11-15 RX ORDER — CEPHALEXIN 500 MG/1
500 CAPSULE ORAL EVERY 6 HOURS
Qty: 28 CAPSULE | Refills: 0 | Status: SHIPPED | OUTPATIENT
Start: 2022-11-15 | End: 2022-12-12

## 2022-11-15 RX ORDER — IBUPROFEN 800 MG/1
800 TABLET ORAL 3 TIMES DAILY
Qty: 30 TABLET | Refills: 0 | Status: SHIPPED | OUTPATIENT
Start: 2022-11-15

## 2022-11-15 RX ORDER — SUCCINYLCHOLINE CHLORIDE 20 MG/ML
INJECTION INTRAMUSCULAR; INTRAVENOUS
Status: DISCONTINUED | OUTPATIENT
Start: 2022-11-15 | End: 2022-11-15

## 2022-11-15 RX ORDER — TRANEXAMIC ACID 100 MG/ML
INJECTION, SOLUTION INTRAVENOUS
Status: DISCONTINUED | OUTPATIENT
Start: 2022-11-15 | End: 2022-11-15 | Stop reason: HOSPADM

## 2022-11-15 RX ORDER — OXYCODONE HYDROCHLORIDE 5 MG/1
5 TABLET ORAL EVERY 6 HOURS PRN
Qty: 12 TABLET | Refills: 0 | Status: SHIPPED | OUTPATIENT
Start: 2022-11-15 | End: 2022-11-18 | Stop reason: SDUPTHER

## 2022-11-15 RX ORDER — LIDOCAINE HYDROCHLORIDE 10 MG/ML
1 INJECTION, SOLUTION EPIDURAL; INFILTRATION; INTRACAUDAL; PERINEURAL ONCE
Status: DISCONTINUED | OUTPATIENT
Start: 2022-11-15 | End: 2022-11-15 | Stop reason: HOSPADM

## 2022-11-15 RX ORDER — FENTANYL CITRATE 50 UG/ML
25 INJECTION, SOLUTION INTRAMUSCULAR; INTRAVENOUS EVERY 5 MIN PRN
Status: DISCONTINUED | OUTPATIENT
Start: 2022-11-15 | End: 2022-11-15 | Stop reason: HOSPADM

## 2022-11-15 RX ORDER — SODIUM CHLORIDE, SODIUM LACTATE, POTASSIUM CHLORIDE, CALCIUM CHLORIDE 600; 310; 30; 20 MG/100ML; MG/100ML; MG/100ML; MG/100ML
INJECTION, SOLUTION INTRAVENOUS CONTINUOUS
Status: DISCONTINUED | OUTPATIENT
Start: 2022-11-15 | End: 2022-11-15 | Stop reason: HOSPADM

## 2022-11-15 RX ORDER — LIDOCAINE HCL/PF 100 MG/5ML
SYRINGE (ML) INTRAVENOUS
Status: DISCONTINUED | OUTPATIENT
Start: 2022-11-15 | End: 2022-11-15

## 2022-11-15 RX ORDER — MUPIROCIN 20 MG/G
OINTMENT TOPICAL
Status: DISCONTINUED
Start: 2022-11-15 | End: 2022-11-15 | Stop reason: HOSPADM

## 2022-11-15 RX ORDER — DIPHENHYDRAMINE HYDROCHLORIDE 50 MG/ML
12.5 INJECTION INTRAMUSCULAR; INTRAVENOUS ONCE AS NEEDED
Status: DISCONTINUED | OUTPATIENT
Start: 2022-11-15 | End: 2022-11-15 | Stop reason: HOSPADM

## 2022-11-15 RX ORDER — EPINEPHRINE 1 MG/ML
INJECTION, SOLUTION, CONCENTRATE INTRAVENOUS
Status: DISCONTINUED
Start: 2022-11-15 | End: 2022-11-15 | Stop reason: HOSPADM

## 2022-11-15 RX ORDER — DEXAMETHASONE SODIUM PHOSPHATE 4 MG/ML
INJECTION, SOLUTION INTRA-ARTICULAR; INTRALESIONAL; INTRAMUSCULAR; INTRAVENOUS; SOFT TISSUE
Status: DISCONTINUED | OUTPATIENT
Start: 2022-11-15 | End: 2022-11-15

## 2022-11-15 RX ORDER — MEPERIDINE HYDROCHLORIDE 50 MG/ML
12.5 INJECTION INTRAMUSCULAR; INTRAVENOUS; SUBCUTANEOUS ONCE AS NEEDED
Status: DISCONTINUED | OUTPATIENT
Start: 2022-11-15 | End: 2022-11-15 | Stop reason: HOSPADM

## 2022-11-15 RX ORDER — PROCHLORPERAZINE EDISYLATE 5 MG/ML
5 INJECTION INTRAMUSCULAR; INTRAVENOUS EVERY 30 MIN PRN
Status: DISCONTINUED | OUTPATIENT
Start: 2022-11-15 | End: 2022-11-15 | Stop reason: HOSPADM

## 2022-11-15 RX ORDER — ONDANSETRON HYDROCHLORIDE 2 MG/ML
INJECTION, SOLUTION INTRAMUSCULAR; INTRAVENOUS
Status: DISCONTINUED | OUTPATIENT
Start: 2022-11-15 | End: 2022-11-15

## 2022-11-15 RX ORDER — ACETAMINOPHEN 10 MG/ML
INJECTION, SOLUTION INTRAVENOUS
Status: DISCONTINUED | OUTPATIENT
Start: 2022-11-15 | End: 2022-11-15

## 2022-11-15 RX ORDER — PROPOFOL 10 MG/ML
VIAL (ML) INTRAVENOUS
Status: DISCONTINUED | OUTPATIENT
Start: 2022-11-15 | End: 2022-11-15

## 2022-11-15 RX ORDER — ONDANSETRON 2 MG/ML
4 INJECTION INTRAMUSCULAR; INTRAVENOUS ONCE AS NEEDED
Status: COMPLETED | OUTPATIENT
Start: 2022-11-15 | End: 2022-11-15

## 2022-11-15 RX ORDER — MUPIROCIN 20 MG/G
OINTMENT TOPICAL
Status: DISCONTINUED | OUTPATIENT
Start: 2022-11-15 | End: 2022-11-15 | Stop reason: HOSPADM

## 2022-11-15 RX ORDER — MIDAZOLAM HYDROCHLORIDE 1 MG/ML
INJECTION INTRAMUSCULAR; INTRAVENOUS
Status: DISCONTINUED | OUTPATIENT
Start: 2022-11-15 | End: 2022-11-15

## 2022-11-15 RX ORDER — FENTANYL CITRATE 50 UG/ML
INJECTION, SOLUTION INTRAMUSCULAR; INTRAVENOUS
Status: DISCONTINUED | OUTPATIENT
Start: 2022-11-15 | End: 2022-11-15

## 2022-11-15 RX ORDER — TRANEXAMIC ACID 100 MG/ML
INJECTION, SOLUTION INTRAVENOUS
Status: DISCONTINUED | OUTPATIENT
Start: 2022-11-15 | End: 2022-11-15

## 2022-11-15 RX ORDER — OXYCODONE HYDROCHLORIDE 5 MG/1
5 TABLET ORAL ONCE AS NEEDED
Status: DISCONTINUED | OUTPATIENT
Start: 2022-11-15 | End: 2022-11-15 | Stop reason: HOSPADM

## 2022-11-15 RX ADMIN — MIDAZOLAM HYDROCHLORIDE 2 MG: 1 INJECTION INTRAMUSCULAR; INTRAVENOUS at 07:11

## 2022-11-15 RX ADMIN — FENTANYL CITRATE 25 MCG: 50 INJECTION, SOLUTION INTRAMUSCULAR; INTRAVENOUS at 11:11

## 2022-11-15 RX ADMIN — FENTANYL CITRATE 25 MCG: 50 INJECTION, SOLUTION INTRAMUSCULAR; INTRAVENOUS at 10:11

## 2022-11-15 RX ADMIN — DEXAMETHASONE SODIUM PHOSPHATE 8 MG: 4 INJECTION, SOLUTION INTRA-ARTICULAR; INTRALESIONAL; INTRAMUSCULAR; INTRAVENOUS; SOFT TISSUE at 07:11

## 2022-11-15 RX ADMIN — ONDANSETRON HYDROCHLORIDE 4 MG: 2 INJECTION, SOLUTION INTRAMUSCULAR; INTRAVENOUS at 07:11

## 2022-11-15 RX ADMIN — ONDANSETRON 4 MG: 2 INJECTION INTRAMUSCULAR; INTRAVENOUS at 12:11

## 2022-11-15 RX ADMIN — SODIUM CHLORIDE, SODIUM LACTATE, POTASSIUM CHLORIDE, CALCIUM CHLORIDE: 600; 310; 30; 20 INJECTION, SOLUTION INTRAVENOUS at 09:11

## 2022-11-15 RX ADMIN — FENTANYL CITRATE 25 MCG: 50 INJECTION, SOLUTION INTRAMUSCULAR; INTRAVENOUS at 12:11

## 2022-11-15 RX ADMIN — Medication 75 MG: at 07:11

## 2022-11-15 RX ADMIN — FENTANYL CITRATE 100 MCG: 50 INJECTION, SOLUTION INTRAMUSCULAR; INTRAVENOUS at 07:11

## 2022-11-15 RX ADMIN — Medication 150 MG: at 07:11

## 2022-11-15 RX ADMIN — OXYCODONE HYDROCHLORIDE 5 MG: 5 TABLET ORAL at 12:11

## 2022-11-15 RX ADMIN — ROCURONIUM BROMIDE 5 MG: 10 INJECTION, SOLUTION INTRAVENOUS at 07:11

## 2022-11-15 RX ADMIN — ROCURONIUM BROMIDE 25 MG: 10 INJECTION, SOLUTION INTRAVENOUS at 07:11

## 2022-11-15 RX ADMIN — SODIUM CHLORIDE, SODIUM LACTATE, POTASSIUM CHLORIDE, CALCIUM CHLORIDE: 600; 310; 30; 20 INJECTION, SOLUTION INTRAVENOUS at 07:11

## 2022-11-15 RX ADMIN — TRANEXAMIC ACID 1000 MG: 100 INJECTION, SOLUTION INTRAVENOUS at 11:11

## 2022-11-15 RX ADMIN — ACETAMINOPHEN 1000 MG: 10 INJECTION, SOLUTION INTRAVENOUS at 07:11

## 2022-11-15 RX ADMIN — PROCHLORPERAZINE EDISYLATE 5 MG: 5 INJECTION INTRAMUSCULAR; INTRAVENOUS at 12:11

## 2022-11-15 RX ADMIN — SUCCINYLCHOLINE CHLORIDE 120 MG: 20 INJECTION INTRAMUSCULAR; INTRAVENOUS at 07:11
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
## 2022-11-15 NOTE — DISCHARGE SUMMARY
[facility]  Discharge Note  Short Stay    Procedure(s) (LRB):  SEPTOPLASTY, NOSE (N/A)  MAXILLARY SINUSOTOMY (Bilateral)  REDUCTION, NASAL TURBINATE (Bilateral)      OUTCOME: Patient tolerated treatment/procedure well without complication and is now ready for discharge.    DISPOSITION: Home or Self Care    FINAL DIAGNOSIS:  Deviated septum w/perforation, turbinate hypertrophy and maxillary sinusitis bilaterally    FOLLOWUP: In clinic as scheduled 11/23/22    DISCHARGE INSTRUCTIONS:      DIET:  Advanced to a regular diet slowly today encouraging fluids.      ACTIVITY:  No strenuous activity or heavy play for 1-2 weeks as discussed.  May return to work only with light duty in 3-5 days.    WOUND CARE:  Lots of saline in the nose to prevent scabbing, crusting of the turbinates, keep splints open for breathing, as well as to clear mucus. Have Afrin/oxymetazoline on hand to help stop bleeding if bleeding occurs in is persistent.  Any heavy bleeding that will not stop needs to be evaluated in the emergency department. Keep Aquaphor in the LEFT nasal sill to keep open wound of the left floor of the nose moist/covered.    CALL:  Call with any concerns including temperature greater than 101°, persistent bleeding, neck stiffness, or any other concerns.    FOLLOW-UP:  As scheduled. Return sooner with any concerns.    MEDICATIONS:One week of oral antibiotics.  Have Afrin/oxymetazoline on hand to help stop bleeding if bleeding occurs in his persistent.  Ibuprofen 3 times daily with Tylenol as needed in between.  Use the oxycodone ONLY as a third agent for breakthrough pain.       TIME SPENT ON DISCHARGE: 15 minutes

## 2022-11-15 NOTE — ANESTHESIA PROCEDURE NOTES
Intubation    Date/Time: 11/15/2022 7:39 AM  Performed by: Clinton Mcknight CRNA  Authorized by: Edwar Ramesh MD     Intubation:     Induction:  Intravenous    Intubated:  Postinduction    Mask Ventilation:  Easy mask    Attempts:  1    Attempted By:  CRNA    Method of Intubation:  Video laryngoscopy    Blade:  Mccall 3    Laryngeal View Grade: Grade I - full view of cords      Difficult Airway Encountered?: No      Complications:  None    Airway Device:  Oral endotracheal tube    Airway Device Size:  7.5    Style/Cuff Inflation:  Cuffed (inflated to minimal occlusive pressure)    Tube secured:  21    Secured at:  The teeth    Placement Verified By:  Capnometry    Complicating Factors:  None    Findings Post-Intubation:  BS equal bilateral

## 2022-11-15 NOTE — TRANSFER OF CARE
Anesthesia Transfer of Care Note    Patient: Erasto Wharton    Procedure(s) Performed: Procedure(s) (LRB):  SEPTOPLASTY, NOSE (N/A)  MAXILLARY SINUSOTOMY (Bilateral)  REDUCTION, NASAL TURBINATE (Bilateral)    Patient location: PACU    Anesthesia Type: general    Transport from OR: Transported from OR on 2-3 L/min O2 by NC with adequate spontaneous ventilation    Post pain: adequate analgesia    Post assessment: no apparent anesthetic complications and tolerated procedure well    Post vital signs: stable    Level of consciousness: awake, alert and oriented    Nausea/Vomiting: no nausea/vomiting    Complications: none    Transfer of care protocol was followed      Last vitals:   Visit Vitals  /62   Pulse 88   Temp 36.7 °C (98.1 °F) (Skin)   Resp 20   Wt 108.2 kg (238 lb 8.6 oz)   SpO2 98%   BMI 35.23 kg/m²

## 2022-11-15 NOTE — PLAN OF CARE
Discharge instructions given to pt/kaity', verbalized understanding.  Tolerating PO fluids.  Pain/nausea meds given as ordered.  IV removed.  Prescriptions sent to pharmacy per dr kay.  Scant bloody drainage noted to natalia saravia.  Extra gauze/tape provided.  Voided x1.  Follow up appt 11/28 0800. Wheeled out to kaity'  per RN in no distress.

## 2022-11-15 NOTE — PATIENT INSTRUCTIONS
INSTRUCTIONS TO FOLLOW AFTER SINUS AND NASAL SURGERY  DR. SCHNEIDER - [facility]    Office hours:  Weekdays 8:00 am to 5:00 pm.  Please call [phone] and ask to speak with his nurse or medical assistant.    After-hours & weekends:  Please call [phone] and ask to speak with Dr. Irvin or the ENT physician on call.    Your first office visit with Dr. Irvin after surgery should have been already scheduled.  If you dont know when it is, call Dr. Vilalrreal nurse or medical assistant at [phone].    Please call immediately if you have:    Temperature of 101° F or greater  Any unusual, painful swelling  Any active bleeding that saturates more than a 4x4 gauze  Any thick drainage green or yellow drainage  Changes in vision or swelling around the eye  Pain not relieved by your prescribed pain medication    ACTIVITY:    Sleep on your back with the head of the bead elevated, up on 2-3 pillows, or in a recliner for the first 3 to 5 days. This will help with swelling.     After surgery you may have a lot of nasal drainage. This is normal. You may breathe through your nose if youre able but avoid inhaling forcibly. Let all drainage fall on your mustache dressing and change it as needed.    You may wake up after surgery with thick white stockings on. Wear them until you are walking around more. It is important to walk around often while at home to keep your blood circulating and prevent blood clots.    If you use CPAP or BiPAP to sleep at night, you should wait at least 48 hours before resuming use. Dr. Irvin will advise you when it is safe to do this.    You may shower 24 hours after surgery.    RESTRICTED ACTIVITIES AFTER SURGERY:    DO NOT blow your nose for 2 weeks. The only exception is that you may lightly blow your nose after using the sinus rinse. If you have to sneeze or cough, do so with your mouth open.     AVOID all heavy lifting, straining or bending for 2 weeks.     AVOID any sexual  activity for 2 weeks after surgery.    AVOID semi-contact sports or vigorous exercising for 3-4 weeks. Dr. Irvin will let you know when you are cleared to resume exercise.    AVOID flying or swimming for 2 weeks.      DO NOT operate a motor vehicle or any type of heavy machinery within 24 hours of taking prescription pain medication.    DO NOT smoke or be around smokers.    AVOID irritating substances that might make you sneeze, such as dust, chalk, harsh chemicals, and allergic triggers. This might also include spicy foods.    DRESSINGS:    Change the gauze mustache dressing under your nose as needed. (If unsure what this dressing is or how to do this, ask your doctor or nurse before you leave the hospital.) You may have pinkish-red drainage for 2-3 days.    Usually there is no gauze packing placed inside the nose.  If packing is necessary, you will be informed by your surgeon.  Do not touch or pull at the packing. The packing will be removed by your doctor at your first visit after surgery.     You may also have a dissolvable stent or dissolvable sponge placed into the sinuses during surgery.  These usually do not need to be removed unless you are told otherwise by Dr. Irvin.  You may notice small fragments of these items come out of your nose in the weeks following surgery.    MEDICATIONS:    After surgery, you will be sent home with prescriptions for pain medication and an anti-nausea medication. Antibiotics are usually not necessary.    Most people need pain medication for the first few days after surgery, although a narcotic is rarely necessary. The best pain control comes from a combination of ACETAMINOPHEN (Tylenol) and IBUPROFEN (Motrin). You will be given prescriptions for these at the recommended dose. These can be alternated so that you are taking something every 2 or 3 hours.    Some people have problems with bowel movements after surgery. If you have NOT had a bowel movement 3-5 days after  surgery, go to your local pharmacy and purchase an over the counter stool softener such as COLACE. You can also ask the pharmacist for his or her recommendation. If you still do not have a bowel movement after starting the softener, please call the office.    You may be given an ointment called MUPIROCIN to use after surgery if you also had septal surgery. If you are given this, use a cotton swab to apply gently inside the nostrils. Do this 2 times a day for 1 week.    You will need these over-the-counter medications after surgery:    SALINE SINUS RINSE (Mauricio Med brand): You will use this to rinse out your nose and sinuses after surgery. Begin doing this the day after surgery, unless instructed otherwise by Dr. Irvin.  You should do this 2 times a day, following the instructions on the box.    AFRIN (regular strength): Only use if you have nasal congestion or bleeding. Use 2 times per day for 3 days, stop for 1 day, continue 2 times per day for 3 days, AND NO MORE, then stop completely.  NOTE:  You may not need to do this at all.    DIET:    Avoid hot and spicy foods for 1 week after surgery.    Begin with bland foods the evening after surgery and advance to your regular diet as tolerated. It is not necessary to take only soft food unless you are recovering from tonsil surgery.    Drink plenty of fluids (water is best).     Avoid alcoholic and caffeinated beverages for 1 week after surgery(unless you are a habitual drinker at risk of withdrawal) because they can cause you to become dehydrated.

## 2022-11-15 NOTE — ANESTHESIA PREPROCEDURE EVALUATION
11/15/2022  Erasto Wharton is a 22 y.o., male.      Pre-op Assessment    I have reviewed the Patient Summary Reports.     I have reviewed the Nursing Notes. I have reviewed the NPO Status.   I have reviewed the Medications.     Review of Systems  Anesthesia Hx:  No problems with previous Anesthesia Denies Hx of Anesthetic complications    Social:  Non-Smoker    Cardiovascular:   Denies Hypertension.  Denies MI.  Denies CAD.    Denies CABG/stent.   Denies Angina.    Pulmonary:   Denies COPD.  Denies Asthma.  Denies Recent URI.    Renal/:   Denies Chronic Renal Disease.     Hepatic/GI:   Denies GERD. Denies Liver Disease.    Neurological:   Denies TIA. Denies CVA. Denies Seizures.    Endocrine:   Denies Diabetes. Denies Hypothyroidism.    Psych:   Denies Psychiatric History.          Physical Exam  General: Well nourished, Cooperative, Alert and Oriented    Airway:  Mallampati: II / II  Mouth Opening: Normal  TM Distance: 4 - 6 cm  Tongue: Normal    Dental:  Intact    Chest/Lungs:  Clear to auscultation, Normal Respiratory Rate    Heart:  Rate: Normal  Rhythm: Regular Rhythm  Sounds: Normal        Anesthesia Plan  Type of Anesthesia, risks & benefits discussed:    Anesthesia Type: Gen Natural Airway, Gen ETT  Intra-op Monitoring Plan: Standard ASA Monitors  Induction:  IV  Informed Consent: Informed consent signed with the Patient and all parties understand the risks and agree with anesthesia plan.  All questions answered.   ASA Score: 2    Ready For Surgery From Anesthesia Perspective.     .

## 2022-11-16 VITALS
WEIGHT: 238.56 LBS | TEMPERATURE: 99 F | BODY MASS INDEX: 35.23 KG/M2 | HEART RATE: 107 BPM | DIASTOLIC BLOOD PRESSURE: 85 MMHG | SYSTOLIC BLOOD PRESSURE: 154 MMHG | OXYGEN SATURATION: 94 % | RESPIRATION RATE: 20 BRPM

## 2022-11-16 NOTE — H&P
See office note from 10/27/2022.    The patient has been examined and the H&P has been reviewed:     I concur with the findings and no changes have occurred since H&P was written.     Surgery risks, benefits and alternative options discussed and understood by patient/family.     Patient is appropriate for ASC surgery based on anesthesia and my own evaluations.

## 2022-11-16 NOTE — ANESTHESIA POSTPROCEDURE EVALUATION
Anesthesia Post Evaluation    Patient: Erasto Wharton    Procedure(s) Performed: Procedure(s) (LRB):  SEPTOPLASTY, NOSE (N/A)  MAXILLARY SINUSOTOMY (Bilateral)  REDUCTION, NASAL TURBINATE (Bilateral)    Final Anesthesia Type: general      Patient location during evaluation: PACU  Patient participation: Yes- Able to Participate  Level of consciousness: awake and alert and oriented  Post-procedure vital signs: reviewed and stable  Pain management: adequate  Airway patency: patent    PONV status at discharge: No PONV  Anesthetic complications: no      Cardiovascular status: blood pressure returned to baseline  Respiratory status: unassisted, spontaneous ventilation and room air  Hydration status: euvolemic  Follow-up not needed.          Vitals Value Taken Time   /85 11/15/22 1301   Temp 37.1 °C (98.8 °F) 11/15/22 1130   Pulse 109 11/15/22 1316   Resp 20 11/15/22 1300   SpO2 97 % 11/15/22 1317   Vitals shown include unvalidated device data.      Event Time   Out of Recovery 13:30:00         Pain/Ant Score: Pain Rating Prior to Med Admin: 5 (11/15/2022 12:59 PM)  Modified Ant Score: 19 (11/15/2022  1:25 PM)

## 2022-11-17 ENCOUNTER — PATIENT MESSAGE (OUTPATIENT)
Dept: OTOLARYNGOLOGY | Facility: CLINIC | Age: 22
End: 2022-11-17
Payer: MEDICAID

## 2022-11-18 RX ORDER — PREDNISONE 10 MG/1
TABLET ORAL
Qty: 23 TABLET | Refills: 0 | Status: SHIPPED | OUTPATIENT
Start: 2022-11-18 | End: 2022-12-12

## 2022-11-18 RX ORDER — OXYCODONE HYDROCHLORIDE 5 MG/1
5 TABLET ORAL EVERY 4 HOURS PRN
Qty: 12 TABLET | Refills: 0 | Status: SHIPPED | OUTPATIENT
Start: 2022-11-18 | End: 2022-12-12

## 2022-11-21 LAB
FINAL PATHOLOGIC DIAGNOSIS: NORMAL
GROSS: NORMAL
Lab: NORMAL
MICROSCOPIC EXAM: NORMAL

## 2022-11-28 ENCOUNTER — OFFICE VISIT (OUTPATIENT)
Dept: OTOLARYNGOLOGY | Facility: CLINIC | Age: 22
End: 2022-11-28
Payer: MEDICAID

## 2022-11-28 VITALS — TEMPERATURE: 98 F | HEIGHT: 69 IN | WEIGHT: 232.56 LBS | BODY MASS INDEX: 34.45 KG/M2

## 2022-11-28 DIAGNOSIS — Z98.890 S/P NASAL SEPTOPLASTY: Primary | ICD-10-CM

## 2022-11-28 PROCEDURE — 99999 PR PBB SHADOW E&M-EST. PATIENT-LVL III: ICD-10-PCS | Mod: PBBFAC,,, | Performed by: OTOLARYNGOLOGY

## 2022-11-28 PROCEDURE — 99213 OFFICE O/P EST LOW 20 MIN: CPT | Mod: PBBFAC,PO | Performed by: OTOLARYNGOLOGY

## 2022-11-28 PROCEDURE — 3008F PR BODY MASS INDEX (BMI) DOCUMENTED: ICD-10-PCS | Mod: CPTII,,, | Performed by: OTOLARYNGOLOGY

## 2022-11-28 PROCEDURE — 99999 PR PBB SHADOW E&M-EST. PATIENT-LVL III: CPT | Mod: PBBFAC,,, | Performed by: OTOLARYNGOLOGY

## 2022-11-28 PROCEDURE — 99024 PR POST-OP FOLLOW-UP VISIT: ICD-10-PCS | Mod: ,,, | Performed by: OTOLARYNGOLOGY

## 2022-11-28 PROCEDURE — 1159F PR MEDICATION LIST DOCUMENTED IN MEDICAL RECORD: ICD-10-PCS | Mod: CPTII,,, | Performed by: OTOLARYNGOLOGY

## 2022-11-28 PROCEDURE — 3008F BODY MASS INDEX DOCD: CPT | Mod: CPTII,,, | Performed by: OTOLARYNGOLOGY

## 2022-11-28 PROCEDURE — 99024 POSTOP FOLLOW-UP VISIT: CPT | Mod: ,,, | Performed by: OTOLARYNGOLOGY

## 2022-11-28 PROCEDURE — 1159F MED LIST DOCD IN RCRD: CPT | Mod: CPTII,,, | Performed by: OTOLARYNGOLOGY

## 2022-11-28 NOTE — PROGRESS NOTES
Thirteen days status post septoplasty left posterior based septal flap closure of perforation, inferior turbinate reductions and bilateral maxillary antrostomies.  Quite a bit of postoperative pain and bleeding ultimately controlled no purulence.  Pain has resolved.      Splints removed heavy clotting occupying a long perforation where the flap on the left side did not attach to the superior septal remnant.  Mucosal sensation occurring along the floor of the nose on left side nicely.  No purulence.  Turbinates well reduced.  Breathing subjectively much improved.  Endoscopy deferred.      Impression     Residual perforation but healing well.  No evidence of active sinusitis.      Recommendation    Saline throughout the day to maintain moisture and prevent residual clotting from crusting and scabbing.  Rinses at least twice daily.  Follow-up in 2 weeks.  Watch for signs and symptoms of infection as discussed including maxillary/cheek pressure pain or pain into the teeth, purulence or foul smell, progressive nasal obstruction.

## 2022-11-28 NOTE — PATIENT INSTRUCTIONS
Saline throughout the day to maintain moisture and prevent residual clotting from crusting and scabbing.  Rinses at least twice daily.  Follow-up in 2 weeks.  Watch for signs and symptoms of infection as discussed including maxillary/cheek pressure pain or pain into the teeth, purulence or foul smell, progressive nasal obstruction.    NASAL SALINE    Still saline comes in many preparations including sprays/mists, gels, and rinses.  Different preparations served different purposes.  Saline spray helps to briefly moisturize the nose and help clear mucus.  Saline gels coat the nose for longer protective benefit of keeping the linings the nose moist.  Saline rinses clear the nose and sinuses and a more thorough way in her best used for significant postnasal drip and sinus complaints.  A combination of saline sprays/mists, gels and rinses should be used to address routine nasal clearing and dryness issues as well as flushing for better control of allergy and postnasal drip symptoms.  There is no real risk of over use of nasal saline products.  Saline sprays do not have any of the potential rebound or addiction of nasal decongestant sprays.  Nasal saline sprays and rinses should be used prior to the application of any medicated nasal sprays such as nasal steroids or nasal antihistamine sprays.        SINUS RINSE INSTRUCTIONS    Nasal Saline Irrigation Instructions  You can wash your nasal and sinus passages using nasal saline (salt water) irrigation. This   is simple and effective. Follow the instructions below, as well as the ones provided by your   physician.  Supplies  First, you will need a nasal saline irrigation bottle and rinse solution.   You can purchase nasal rinse kits that include these items (such as   NeilMed®, Ayr®, Simply Saline®, Ocean Complete®) at most drug   stores. You can also make your own saline irrigation solution by   adding kosher (non-iodine) salt and baking soda to distilled water.   Your  physician may tell you to add medications like a steroid or   antibiotic to the rinse as needed.  Steps for nasal irrigation  Step 1. Fill the bottle  ? Wash your hands.  ? Fill the irrigation bottle with lukewarm distilled water or boiled water that has cooled.  Step 2. Mix the solution  ? Put the saline and salt packet contents into the bottle.  ? Tighten the top of the bottle and shake it gently to dissolve the mixture.  ? If you are making your own solution:   - Add 1/4 to 1/2 teaspoon of baking soda and 1/8 teaspoon of kosher (non-iodine) salt   into the bottle.   - Tighten the top of the bottle.   - Shake the bottle gently to dissolve the mixture.  Step 3. Get into position  ?  front of the sink.  ? Unless you were instructed to use another position, bend forward.   Then tilt your face down about 45 degrees so that you are looking   down into the sink.  ? Gently place the spout of the saline bottle against 1 of your nostrils.  [facility]  CARE AND TREATMENT  Patient Education  ©2018 [employer]  ©2018 [employer]  Step 4. Rinse  ? Breathing through your mouth, gently squeeze the   bottle. This will squirt the solution into your nostril. The   solution will start to drain from the other nostril. Some   may drain from your mouth. This is normal.  ? Use 2 ounces (half of the bottle) on each nostril.  ? Afterwards, you may need to blow your nose gently to   help drain any solution that is left behind.  Step 5. Repeat  ? Repeat steps 3 and 4 with the other nostril.  You can watch a video to learn how to do nasal saline irrigation. Go to [email or web address] and   search for NeilMed Sinus Rinse.  Step 6.  Clean the bottle and cap. Air dry the Sinus Rinse bottle, cap, and tube on a clean paper towel, a lint free towel, or use NeilMed® NasaDOCK® or NasaDOCK plus (sold separately) to store the bottle, cap and tube.  Please read Warnings before using.  Our  recommendation is to replace the bottle every three months.      NEILMED CLEAING INSTRUCTIONS    It is very important to keep these devices clean and free from any contamination. Replace the bottle every 3 months.  NasaDock Plus  NasaDock NeilMed® SINUS RINSE Squeeze Bottle: Please perform routine inspections of the bottle and tube for any discolorations and cracks. If there are any visual signs of deterioration or permanent color changes, please clean thoroughly. If the discolorations remain after cleansing, discard the items and purchase new ones. Please follow these instructions after each use of the product. Be sure to replace your product after three months.  Step 1: Rinse the cap, tube and bottle using running water. Fill the bottle with distilled, micro-filtered (through 0.2 micron), reverse osmosis filtered, commercially bottled or previously boiled and cooled down water at lukewarm or body temperature..  Step 2: Add a few drops of dish washing liquid or baby shampoo.  Step 3: Attach the cap and tube to the bottle; hold your finger over the opening in the cap and shake the bottle vigorously.  Step 4: Squeeze the bottle hard to allow the soapy solution to clean the interior of the tube and the cap. Empty out the bottle completely.  Step 5: Rinse the soap from the bottle, cap and tube thoroughly and place the items on a clean paper towel to dry or use the preferred NasaDOCK® or NasaDOCK plus.    The NasaDOCK® is a simple, hygienic way to dry and store the SINUS RINSE bottle, cap and tube. NasaDOCK® comes with various hanging options and is available in different colors. Our newest model also offers storage for our SINUS RINSE mixture packets. We strongly suggest using NasaDOCK® as an inexpensive, easy way to dry the cap, tube and SINUS RINSE bottle.        Cleaning:  Do not use a  to clean the inside of a bottle. While our bottle is  safe, a  will not adequately  clean the SINUS RINSE bottle. The water jets in dishwashers cannot enter the narrow neck of the bottle, and portions of the bottles interior will not be cleaned thoroughly. Additional methods of cleaning the bottle include the use of concentrated white vinegar or isopropyl alcohol (70% concentration), followed by scrubbing and rinsing as described above.       Microwave Disinfection  Clean the device with soap and water as mentioned above and shake off the excess water. Now place the bottle, cap and tube in the microwave for 40 seconds. This will disinfect the bottle, cap and tube. If the microwave has been used recently, please make sure that the inside of the microwave has cooled back down to room temperature before using it to disinfect the bottle.    NeilMed NasaFlo® Neti Pot Users:  Use the same procedure as above.    Sinugator® Cleaning Directions:  Clean the Sinugator® by running plain water and dry with a clean lint free towel and then air dry the unit by keeping it open to the air. The nasal  tip, blue reservoir and white soft tube can be disinfected by cleaning with soap and water and shaking off the excess water before placing in the home microwave for 60 seconds. Clean the entire unit with a few drops of dishwashing liquid and water every three days to keep the unit clean. As a fi nal rinse to wash off any residual soap or tap water, use either distilled, micro-filtered (through 0.2 micron filter), commercially bottled or previously boiled & cooled down water. Please make sure to rinse thoroughly during each wash so no soap is left behind. DO NOT place the white motor unit in microwave for disinfection. Because of the units stainless steel components, this can cause damage or fire hazards.    General Principles of Maintenance & Storage:  When permissible use a microwave periodically to disinfect devices. Always store NeilMed® products in a cool and dry place with adequate ventilation.  NasaDOCK® or NasaDOCK plus offer a simple hygienic way to air dry & neatly store the bottle, cap, tube and NasaFlo. Do not store the bottle with the cap screwed on, unless both are dry. Do not store the wet parts in a sealed plastic bag. If you travel before they are dry, wrap parts separately in paper towels. Hand soap or shampoo can be used for cleaning parts while away from home.        USE ONLY AS DIRECTED, IF SYMPTOMS PERSIST SEE YOUR DOCTOR/HEALTHCARE PROFESSIONAL. ALWAYS READ THE LABEL.

## 2022-12-12 ENCOUNTER — OFFICE VISIT (OUTPATIENT)
Dept: OTOLARYNGOLOGY | Facility: CLINIC | Age: 22
End: 2022-12-12
Payer: MEDICAID

## 2022-12-12 VITALS — BODY MASS INDEX: 35.1 KG/M2 | HEIGHT: 69 IN | WEIGHT: 237 LBS | TEMPERATURE: 98 F

## 2022-12-12 DIAGNOSIS — J32.0 CHRONIC MAXILLARY SINUSITIS: Primary | ICD-10-CM

## 2022-12-12 DIAGNOSIS — Z98.890 S/P FESS (FUNCTIONAL ENDOSCOPIC SINUS SURGERY): ICD-10-CM

## 2022-12-12 DIAGNOSIS — J34.89 PERFORATED NASAL SEPTUM: ICD-10-CM

## 2022-12-12 PROCEDURE — 99213 OFFICE O/P EST LOW 20 MIN: CPT | Mod: PBBFAC,PO | Performed by: OTOLARYNGOLOGY

## 2022-12-12 PROCEDURE — 99999 PR PBB SHADOW E&M-EST. PATIENT-LVL III: CPT | Mod: PBBFAC,,, | Performed by: OTOLARYNGOLOGY

## 2022-12-12 PROCEDURE — 1159F MED LIST DOCD IN RCRD: CPT | Mod: CPTII,,, | Performed by: OTOLARYNGOLOGY

## 2022-12-12 PROCEDURE — 99999 PR PBB SHADOW E&M-EST. PATIENT-LVL III: ICD-10-PCS | Mod: PBBFAC,,, | Performed by: OTOLARYNGOLOGY

## 2022-12-12 PROCEDURE — 3008F BODY MASS INDEX DOCD: CPT | Mod: CPTII,,, | Performed by: OTOLARYNGOLOGY

## 2022-12-12 PROCEDURE — 1159F PR MEDICATION LIST DOCUMENTED IN MEDICAL RECORD: ICD-10-PCS | Mod: CPTII,,, | Performed by: OTOLARYNGOLOGY

## 2022-12-12 PROCEDURE — 3008F PR BODY MASS INDEX (BMI) DOCUMENTED: ICD-10-PCS | Mod: CPTII,,, | Performed by: OTOLARYNGOLOGY

## 2022-12-12 PROCEDURE — 99024 POSTOP FOLLOW-UP VISIT: CPT | Mod: ,,, | Performed by: OTOLARYNGOLOGY

## 2022-12-12 PROCEDURE — 99024 PR POST-OP FOLLOW-UP VISIT: ICD-10-PCS | Mod: ,,, | Performed by: OTOLARYNGOLOGY

## 2022-12-12 PROCEDURE — 31237 NSL/SINS NDSC SURG BX POLYPC: CPT | Mod: PBBFAC,PO | Performed by: OTOLARYNGOLOGY

## 2022-12-12 RX ORDER — AMOXICILLIN AND CLAVULANATE POTASSIUM 875; 125 MG/1; MG/1
1 TABLET, FILM COATED ORAL EVERY 12 HOURS
COMMUNITY
Start: 2022-12-04 | End: 2023-01-05

## 2022-12-12 NOTE — PROCEDURES
Endoscopic nasal debridement post surgery bilateral.      Topical anesthesia 0.5% oxymetazoline and 4% lidocaine.  0 degree 3 mm rigid telescope used.  Soft friable material debrided from the anterior high right septum as well as the anterior middle meatus bilaterally.  See photos.  No gross purulence no polyps just diffuse granular tissue and slough.  No specimens sent.  Granulation found only in areas of surgical intervention the balance of the mucosa normal-appearing.  Tolerated well with minimal superficial bleeding.

## 2022-12-12 NOTE — PROGRESS NOTES
Post-op Evaluation (status post septoplasty left posterior based septal flap closure of perforation, inferior turbinate reductions and bilateral maxillary antrostomies on 11/15/22.  Went to Urgent Care last week--tested for flu-neg. Was given Augmentin for 10 days (has 2 days left).)    Two weeks followup.  Perforation FAILED to close.  Healing at last visit with splints removed.  Doing rinses daily. NOT using INS.  Put on Augmentin at . No steroids.  Using inhaler and Singulair.  Complains of ongoing right sided congestion.  None on left.  No blood or pus.  Thick mucus on irrigations.    Residual high right septal deviation on anterior exam but patent airway bilaterally.  Endoscopy with inflammation and scarring of middle meatus bilaterally.    Endoscopy with debridement performed see note                      Impression     Residual perforation but healing well.  Heavy inflammatory changes of both the superior septum middle turbinates and middle meatus disproportionate to surgery.      Recommendation    Stable appearing perforation, still some high residual right nasal septal deviation which may be the reason there is ongoing right-sided nasal airway resistance, heavy granulation inflammation on the septum as well as in the area of the minimal sinus surgery.      Finish the last 2 days of the oral antibiotic prescribed by others, continue with rinses at least twice daily and lots of saline in between.  We will add budesonide/Pulmicort ampules dripped into the nose 1/2 of each ampule into the nose with the head off the bed as shown and discussed twice daily after the rinses.  Rinse mouth gargle and spit after the topical nose drops.      Return in 3 weeks as scheduled.  Contact the office sooner with any concerns.

## 2022-12-12 NOTE — PATIENT INSTRUCTIONS
Stable appearing perforation, still some high residual right nasal septal deviation which may be the reason there is ongoing right-sided nasal airway resistance, heavy granulation inflammation on the septum as well as in the area of the minimal sinus surgery.      Finish the last 2 days of the oral antibiotic prescribed by others, continue with rinses at least twice daily and lots of saline in between.  We will add budesonide/Pulmicort ampules dripped into the nose 1/2 of each ampule into the nose with the head off the bed as shown and discussed twice daily after the rinses.  Rinse mouth gargle and spit after the topical nose drops.      Return in 3 weeks as scheduled.  Contact the office sooner with any concerns.    MOFFET POSITION FOR NASAL DROPS      Use the Moffet position has shown below which is most easily accomplished by laying on the bed with the head off the bed dripping the drops into the nose.  Let the drops it in the nose for several minutes as tolerated.  Then get up rinse the mouth gargle and spit to prevent the drops from running down the throat.  Follow the prescription instructions for frequency and dose.

## 2022-12-14 ENCOUNTER — PATIENT MESSAGE (OUTPATIENT)
Dept: OTOLARYNGOLOGY | Facility: CLINIC | Age: 22
End: 2022-12-14
Payer: MEDICAID

## 2022-12-15 RX ORDER — BUDESONIDE 1 MG/2ML
INHALANT ORAL
Qty: 120 ML | Refills: 2 | Status: SHIPPED | OUTPATIENT
Start: 2022-12-15 | End: 2023-01-05 | Stop reason: SDUPTHER

## 2022-12-20 ENCOUNTER — PATIENT MESSAGE (OUTPATIENT)
Dept: OTOLARYNGOLOGY | Facility: CLINIC | Age: 22
End: 2022-12-20
Payer: MEDICAID

## 2023-01-05 ENCOUNTER — OFFICE VISIT (OUTPATIENT)
Dept: OTOLARYNGOLOGY | Facility: CLINIC | Age: 23
End: 2023-01-05
Payer: MEDICAID

## 2023-01-05 VITALS — WEIGHT: 240.31 LBS | BODY MASS INDEX: 35.59 KG/M2 | HEIGHT: 69 IN | TEMPERATURE: 98 F

## 2023-01-05 DIAGNOSIS — J34.89 PERFORATED NASAL SEPTUM: ICD-10-CM

## 2023-01-05 DIAGNOSIS — J32.9 RECURRENT SINUSITIS: ICD-10-CM

## 2023-01-05 DIAGNOSIS — J32.0 CHRONIC MAXILLARY SINUSITIS: Primary | ICD-10-CM

## 2023-01-05 DIAGNOSIS — Z98.890 S/P FESS (FUNCTIONAL ENDOSCOPIC SINUS SURGERY): ICD-10-CM

## 2023-01-05 PROCEDURE — 1160F RVW MEDS BY RX/DR IN RCRD: CPT | Mod: CPTII,,, | Performed by: OTOLARYNGOLOGY

## 2023-01-05 PROCEDURE — 3008F BODY MASS INDEX DOCD: CPT | Mod: CPTII,,, | Performed by: OTOLARYNGOLOGY

## 2023-01-05 PROCEDURE — 1159F MED LIST DOCD IN RCRD: CPT | Mod: CPTII,,, | Performed by: OTOLARYNGOLOGY

## 2023-01-05 PROCEDURE — 99999 PR PBB SHADOW E&M-EST. PATIENT-LVL IV: CPT | Mod: PBBFAC,,, | Performed by: OTOLARYNGOLOGY

## 2023-01-05 PROCEDURE — 99024 PR POST-OP FOLLOW-UP VISIT: ICD-10-PCS | Mod: ,,, | Performed by: OTOLARYNGOLOGY

## 2023-01-05 PROCEDURE — 1159F PR MEDICATION LIST DOCUMENTED IN MEDICAL RECORD: ICD-10-PCS | Mod: CPTII,,, | Performed by: OTOLARYNGOLOGY

## 2023-01-05 PROCEDURE — 99999 PR PBB SHADOW E&M-EST. PATIENT-LVL IV: ICD-10-PCS | Mod: PBBFAC,,, | Performed by: OTOLARYNGOLOGY

## 2023-01-05 PROCEDURE — 3008F PR BODY MASS INDEX (BMI) DOCUMENTED: ICD-10-PCS | Mod: CPTII,,, | Performed by: OTOLARYNGOLOGY

## 2023-01-05 PROCEDURE — 1160F PR REVIEW ALL MEDS BY PRESCRIBER/CLIN PHARMACIST DOCUMENTED: ICD-10-PCS | Mod: CPTII,,, | Performed by: OTOLARYNGOLOGY

## 2023-01-05 PROCEDURE — 99024 POSTOP FOLLOW-UP VISIT: CPT | Mod: ,,, | Performed by: OTOLARYNGOLOGY

## 2023-01-05 PROCEDURE — 99214 OFFICE O/P EST MOD 30 MIN: CPT | Mod: PBBFAC,PO | Performed by: OTOLARYNGOLOGY

## 2023-01-05 NOTE — PATIENT INSTRUCTIONS
Continue with rinses twice daily but instead of doing the budesonide as a nose drop after the rinse added to the rinse for twice daily budesonide spiked saline rinses.  Follow-up in 4 weeks for repeat endoscopy.  If there remains obstructive findings of the right maxillary sinus, and or continued abnormal appearing inflammatory tissue CT scanning revision surgery to open the sinus and obtain tissue for diagnosis maybe recommended.  Laboratory testing for eosinophils, IgG and other laboratory tests that may indicate abnormal vasculitic disease or other causes of a septal perforation and such abnormal inflammatory response in an asthmatic may prove necessary as discussed.  No indication for antibiotic at this time for a small amount of green drainage on rinses only but if symptoms worsen call for antibiotic therapy.
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
## 2023-01-07 ENCOUNTER — PATIENT MESSAGE (OUTPATIENT)
Dept: OTOLARYNGOLOGY | Facility: CLINIC | Age: 23
End: 2023-01-07
Payer: MEDICAID

## 2023-01-09 ENCOUNTER — HOSPITAL ENCOUNTER (OUTPATIENT)
Dept: PULMONOLOGY | Facility: HOSPITAL | Age: 23
Discharge: HOME | End: 2023-01-09
Attending: INTERNAL MEDICINE
Payer: MEDICAID

## 2023-01-09 ENCOUNTER — TELEPHONE (OUTPATIENT)
Dept: PULMONOLOGY | Facility: CLINIC | Age: 23
End: 2023-01-09
Payer: MEDICAID

## 2023-01-09 DIAGNOSIS — J45.50 SEVERE PERSISTENT ASTHMA WITHOUT COMPLICATION: ICD-10-CM

## 2023-01-09 LAB
BRPFT: NORMAL
DLCO ADJ PRE: 33.15 ML/(MIN*MMHG)
DLCO SINGLE BREATH LLN: 28.2
DLCO SINGLE BREATH PRE REF: 94.4 %
DLCO SINGLE BREATH REF: 35.12
DLCOC SBVA LLN: 3.77
DLCOC SBVA PRE REF: 109.6 %
DLCOC SBVA REF: 5.09
DLCOC SINGLE BREATH LLN: 28.2
DLCOC SINGLE BREATH PRE REF: 94.4 %
DLCOC SINGLE BREATH REF: 35.12
DLCOVA LLN: 3.77
DLCOVA PRE REF: 109.6 %
DLCOVA PRE: 5.58 ML/(MIN*MMHG*L)
DLCOVA REF: 5.09
DLVAADJ PRE: 5.58 ML/(MIN*MMHG*L)
ERVN2 LLN: -16448.38
ERVN2 PRE REF: 53 %
ERVN2 PRE: 0.86 L
ERVN2 REF: 1.62
FEF 25 75 CHG: 3.5 %
FEF 25 75 LLN: 3.15
FEF 25 75 POST REF: 107.6 %
FEF 25 75 PRE REF: 103.9 %
FEF 25 75 REF: 4.87
FET100 CHG: -3.4 %
FEV1 CHG: 1.7 %
FEV1 FVC CHG: 2.9 %
FEV1 FVC LLN: 74
FEV1 FVC POST REF: 104.5 %
FEV1 FVC PRE REF: 101.6 %
FEV1 FVC REF: 85
FEV1 LLN: 3.66
FEV1 POST REF: 93.2 %
FEV1 PRE REF: 91.7 %
FEV1 REF: 4.51
FRCN2 LLN: 2.24
FRCN2 PRE REF: 60.3 %
FRCN2 REF: 3.23
FVC CHG: -1.2 %
FVC LLN: 4.32
FVC POST REF: 88.4 %
FVC PRE REF: 89.5 %
FVC REF: 5.33
IVC PRE: 4.74 L
IVC SINGLE BREATH LLN: 4.32
IVC SINGLE BREATH PRE REF: 88.9 %
IVC SINGLE BREATH REF: 5.33
MVV LLN: 147
MVV PRE REF: 92.2 %
MVV REF: 172
PEF CHG: -5.9 %
PEF LLN: 7.64
PEF POST REF: 95.5 %
PEF PRE REF: 101.4 %
PEF REF: 9.89
POST FEF 25 75: 5.24 L/S
POST FET 100: 7.3 SEC
POST FEV1 FVC: 89.18 %
POST FEV1: 4.2 L
POST FVC: 4.71 L
POST PEF: 9.44 L/S
PRE DLCO: 33.15 ML/(MIN*MMHG)
PRE FEF 25 75: 5.06 L/S
PRE FET 100: 7.56 SEC
PRE FEV1 FVC: 86.63 %
PRE FEV1: 4.13 L
PRE FRC N2: 1.95 L
PRE FVC: 4.77 L
PRE MVV: 159 L/MIN
PRE PEF: 10.03 L/S
RVN2 LLN: 0.94
RVN2 PRE REF: 55 %
RVN2 PRE: 0.89 L
RVN2 REF: 1.61
RVN2TLCN2 LLN: 14.73
RVN2TLCN2 PRE REF: 66.1 %
RVN2TLCN2 PRE: 15.67 %
RVN2TLCN2 REF: 23.71
TLCN2 LLN: 5.75
TLCN2 PRE REF: 82 %
TLCN2 PRE: 5.66 L
TLCN2 REF: 6.9
VA PRE: 5.95 L
VA SINGLE BREATH LLN: 6.75
VA SINGLE BREATH PRE REF: 88 %
VA SINGLE BREATH REF: 6.75
VCMAXN2 LLN: 4.32
VCMAXN2 PRE REF: 89.5 %
VCMAXN2 PRE: 4.77 L
VCMAXN2 REF: 5.33

## 2023-01-09 PROCEDURE — 94729 PR C02/MEMBANE DIFFUSE CAPACITY: ICD-10-PCS | Mod: 26,,, | Performed by: INTERNAL MEDICINE

## 2023-01-09 PROCEDURE — 94060 EVALUATION OF WHEEZING: CPT | Mod: 26,,, | Performed by: INTERNAL MEDICINE

## 2023-01-09 PROCEDURE — 94727 GAS DIL/WSHOT DETER LNG VOL: CPT

## 2023-01-09 PROCEDURE — 94727 GAS DIL/WSHOT DETER LNG VOL: CPT | Mod: 26,,, | Performed by: INTERNAL MEDICINE

## 2023-01-09 PROCEDURE — 94060 PR EVAL OF BRONCHOSPASM: ICD-10-PCS | Mod: 26,,, | Performed by: INTERNAL MEDICINE

## 2023-01-09 PROCEDURE — 94060 EVALUATION OF WHEEZING: CPT

## 2023-01-09 PROCEDURE — 94727 PR PULM FUNCTION TEST BY GAS: ICD-10-PCS | Mod: 26,,, | Performed by: INTERNAL MEDICINE

## 2023-01-09 PROCEDURE — 94729 DIFFUSING CAPACITY: CPT

## 2023-01-09 PROCEDURE — 94729 DIFFUSING CAPACITY: CPT | Mod: 26,,, | Performed by: INTERNAL MEDICINE

## 2023-01-09 RX ORDER — ALBUTEROL SULFATE 2.5 MG/.5ML
2.5 SOLUTION RESPIRATORY (INHALATION)
Status: COMPLETED | OUTPATIENT
Start: 2023-01-09 | End: 2023-01-09

## 2023-01-09 RX ADMIN — ALBUTEROL SULFATE 2.5 MG: 2.5 SOLUTION RESPIRATORY (INHALATION) at 08:01

## 2023-01-09 NOTE — TELEPHONE ENCOUNTER
----- Message from Durga Donnelly MD sent at 1/9/2023 11:50 AM CST -----  Notify pulmonary functions were within normal range.  Good finding.  No change in plan.

## 2023-01-10 RX ORDER — DOXYCYCLINE 100 MG/1
100 CAPSULE ORAL 2 TIMES DAILY
Qty: 20 CAPSULE | Refills: 0 | OUTPATIENT
Start: 2023-01-10 | End: 2023-01-11

## 2023-01-10 RX ORDER — MUPIROCIN 20 MG/G
OINTMENT TOPICAL
Qty: 22 G | Refills: 0 | Status: SHIPPED | OUTPATIENT
Start: 2023-01-10 | End: 2023-10-30

## 2023-01-11 ENCOUNTER — HOSPITAL ENCOUNTER (EMERGENCY)
Facility: HOSPITAL | Age: 23
Discharge: HOME | End: 2023-01-11
Attending: EMERGENCY MEDICINE
Payer: MEDICAID

## 2023-01-11 VITALS
DIASTOLIC BLOOD PRESSURE: 62 MMHG | OXYGEN SATURATION: 97 % | SYSTOLIC BLOOD PRESSURE: 116 MMHG | BODY MASS INDEX: 35.55 KG/M2 | HEART RATE: 80 BPM | WEIGHT: 240 LBS | RESPIRATION RATE: 16 BRPM | TEMPERATURE: 99 F | HEIGHT: 69 IN

## 2023-01-11 DIAGNOSIS — R42 DIZZINESS: ICD-10-CM

## 2023-01-11 DIAGNOSIS — T88.7XXA NON-DOSE-RELATED ADVERSE REACTION TO MEDICATION, INITIAL ENCOUNTER: ICD-10-CM

## 2023-01-11 DIAGNOSIS — K52.9 GASTROENTERITIS: Primary | ICD-10-CM

## 2023-01-11 DIAGNOSIS — J32.9 SINUSITIS, UNSPECIFIED CHRONICITY, UNSPECIFIED LOCATION: ICD-10-CM

## 2023-01-11 DIAGNOSIS — R11.2 NAUSEA AND VOMITING, UNSPECIFIED VOMITING TYPE: ICD-10-CM

## 2023-01-11 DIAGNOSIS — H81.10 BENIGN PAROXYSMAL POSITIONAL VERTIGO, UNSPECIFIED LATERALITY: ICD-10-CM

## 2023-01-11 LAB
ALBUMIN SERPL BCP-MCNC: 4.3 G/DL (ref 3.5–5.2)
ALP SERPL-CCNC: 90 U/L (ref 55–135)
ALT SERPL W/O P-5'-P-CCNC: 60 U/L (ref 10–44)
ANION GAP SERPL CALC-SCNC: 13 MMOL/L (ref 8–16)
AST SERPL-CCNC: 26 U/L (ref 10–40)
BASOPHILS # BLD AUTO: 0.09 K/UL (ref 0–0.2)
BASOPHILS NFR BLD: 0.7 % (ref 0–1.9)
BILIRUB SERPL-MCNC: 0.5 MG/DL (ref 0.1–1)
BUN SERPL-MCNC: 13 MG/DL (ref 6–20)
CALCIUM SERPL-MCNC: 9.9 MG/DL (ref 8.7–10.5)
CHLORIDE SERPL-SCNC: 105 MMOL/L (ref 95–110)
CO2 SERPL-SCNC: 24 MMOL/L (ref 23–29)
CREAT SERPL-MCNC: 0.9 MG/DL (ref 0.5–1.4)
DIFFERENTIAL METHOD: ABNORMAL
EOSINOPHIL # BLD AUTO: 0.2 K/UL (ref 0–0.5)
EOSINOPHIL NFR BLD: 1.3 % (ref 0–8)
ERYTHROCYTE [DISTWIDTH] IN BLOOD BY AUTOMATED COUNT: 11.9 % (ref 11.5–14.5)
EST. GFR  (NO RACE VARIABLE): >60 ML/MIN/1.73 M^2
GLUCOSE SERPL-MCNC: 103 MG/DL (ref 70–110)
HCT VFR BLD AUTO: 43.3 % (ref 40–54)
HGB BLD-MCNC: 14.6 G/DL (ref 14–18)
IMM GRANULOCYTES # BLD AUTO: 0.22 K/UL (ref 0–0.04)
IMM GRANULOCYTES NFR BLD AUTO: 1.8 % (ref 0–0.5)
LIPASE SERPL-CCNC: 20 U/L (ref 4–60)
LYMPHOCYTES # BLD AUTO: 1.9 K/UL (ref 1–4.8)
LYMPHOCYTES NFR BLD: 15.8 % (ref 18–48)
MCH RBC QN AUTO: 29.5 PG (ref 27–31)
MCHC RBC AUTO-ENTMCNC: 33.7 G/DL (ref 32–36)
MCV RBC AUTO: 88 FL (ref 82–98)
MONOCYTES # BLD AUTO: 1.1 K/UL (ref 0.3–1)
MONOCYTES NFR BLD: 9.3 % (ref 4–15)
NEUTROPHILS # BLD AUTO: 8.8 K/UL (ref 1.8–7.7)
NEUTROPHILS NFR BLD: 71.1 % (ref 38–73)
NRBC BLD-RTO: 0 /100 WBC
PLATELET # BLD AUTO: 274 K/UL (ref 150–450)
PMV BLD AUTO: 9.7 FL (ref 9.2–12.9)
POTASSIUM SERPL-SCNC: 3.9 MMOL/L (ref 3.5–5.1)
PROT SERPL-MCNC: 7.8 G/DL (ref 6–8.4)
RBC # BLD AUTO: 4.95 M/UL (ref 4.6–6.2)
SODIUM SERPL-SCNC: 142 MMOL/L (ref 136–145)
WBC # BLD AUTO: 12.3 K/UL (ref 3.9–12.7)

## 2023-01-11 PROCEDURE — 99284 EMERGENCY DEPT VISIT MOD MDM: CPT | Mod: 25

## 2023-01-11 PROCEDURE — 93010 EKG 12-LEAD: ICD-10-PCS | Mod: ,,, | Performed by: INTERNAL MEDICINE

## 2023-01-11 PROCEDURE — 83690 ASSAY OF LIPASE: CPT | Performed by: NURSE PRACTITIONER

## 2023-01-11 PROCEDURE — 85025 COMPLETE CBC W/AUTO DIFF WBC: CPT | Performed by: NURSE PRACTITIONER

## 2023-01-11 PROCEDURE — 36415 COLL VENOUS BLD VENIPUNCTURE: CPT | Performed by: NURSE PRACTITIONER

## 2023-01-11 PROCEDURE — 93010 ELECTROCARDIOGRAM REPORT: CPT | Mod: ,,, | Performed by: INTERNAL MEDICINE

## 2023-01-11 PROCEDURE — 93005 ELECTROCARDIOGRAM TRACING: CPT

## 2023-01-11 PROCEDURE — 80053 COMPREHEN METABOLIC PANEL: CPT | Performed by: NURSE PRACTITIONER

## 2023-01-11 PROCEDURE — 63600175 PHARM REV CODE 636 W HCPCS: Performed by: NURSE PRACTITIONER

## 2023-01-11 PROCEDURE — 96361 HYDRATE IV INFUSION ADD-ON: CPT

## 2023-01-11 PROCEDURE — 25000003 PHARM REV CODE 250: Performed by: NURSE PRACTITIONER

## 2023-01-11 PROCEDURE — 96374 THER/PROPH/DIAG INJ IV PUSH: CPT

## 2023-01-11 RX ORDER — ONDANSETRON 2 MG/ML
8 INJECTION INTRAMUSCULAR; INTRAVENOUS
Status: COMPLETED | OUTPATIENT
Start: 2023-01-11 | End: 2023-01-11

## 2023-01-11 RX ORDER — AMOXICILLIN AND CLAVULANATE POTASSIUM 875; 125 MG/1; MG/1
1 TABLET, FILM COATED ORAL 2 TIMES DAILY
Qty: 20 TABLET | Refills: 0 | Status: SHIPPED | OUTPATIENT
Start: 2023-01-11 | End: 2023-01-21

## 2023-01-11 RX ORDER — FLUTICASONE PROPIONATE 50 MCG
1 SPRAY, SUSPENSION (ML) NASAL 2 TIMES DAILY PRN
Qty: 15 G | Refills: 0 | Status: SHIPPED | OUTPATIENT
Start: 2023-01-11 | End: 2023-10-31 | Stop reason: SDUPTHER

## 2023-01-11 RX ORDER — ONDANSETRON 4 MG/1
4 TABLET, ORALLY DISINTEGRATING ORAL EVERY 6 HOURS PRN
Qty: 20 TABLET | Refills: 0 | Status: SHIPPED | OUTPATIENT
Start: 2023-01-11

## 2023-01-11 RX ADMIN — ONDANSETRON HYDROCHLORIDE 8 MG: 2 SOLUTION INTRAMUSCULAR; INTRAVENOUS at 10:01

## 2023-01-11 RX ADMIN — SODIUM CHLORIDE 1000 ML: 0.9 INJECTION, SOLUTION INTRAVENOUS at 10:01

## 2023-01-11 NOTE — ED PROVIDER NOTES
"Encounter Date: 1/11/2023    SCRIBE #1 NOTE: I, Dominga Delaney, am scribing for, and in the presence of,  ANTHONY Singh.     History     Chief Complaint   Patient presents with    Nausea     Since this AM, reports currently "getting over a sinus infection"     Time seen by provider: 9:38 AM on 01/11/2023    Erasto Wharton is a 23 y.o. male with a PMHx of deviated septum, chronic sinusitis, intermittent asthma, and acid reflux who presents to the ED for evaluation of persistent nausea with accompanying dizziness (room spinning) that is exacerbated with laying flat.  Patient reports a recent sinus infection for which he was prescribed Doxycycline with his first dose yesterday.  Patient states he did not take the Abx dose today.  He notes 2-3 episodes of non-bloody diarrhea per day, and mentions congestion that has not improved since the start of Doxycycline.  Patient confirms general malaise, but denies a fever, myalgias, changes to eating or drinking habits or any other symptoms at this time.  PSHx includes nasal sinus surgery, laparoscopic cholecystectomy, nasal septoplasty (11/15/2022) and nasal turbinate reduction (11/15/2022).  He has a negative Hx of illicit drug use.      The history is provided by the patient.   Review of patient's allergies indicates:   Allergen Reactions    Benzoyl peroxide Dermatitis, Hives, Itching, Rash and Swelling    Cat/feline products Shortness Of Breath    Grass pollen-june grass standard Shortness Of Breath     Past Medical History:   Diagnosis Date    Acid reflux     Chronic sinusitis, unspecified     Deviated septum     Mild intermittent asthma, uncomplicated     allergy induced asthma     Past Surgical History:   Procedure Laterality Date    CHOLECYSTECTOMY      LAPAROSCOPIC CHOLECYSTECTOMY  08/27/2020    LAPAROSCOPIC CHOLECYSTECTOMY  8/27/2020    Procedure: CHOLECYSTECTOMY, LAPAROSCOPIC;  Surgeon: Gian Peterson MD;  Location: [facility] OR;  Service: General;;  wilbur " video confirmed 8/21/dme    NASAL SEPTOPLASTY N/A 11/15/2022    Procedure: SEPTOPLASTY, NOSE;  Surgeon: Matt Irvin MD;  Location: [facility] OR;  Service: ENT;  Laterality: N/A;  with flap closure of perforation    NASAL SINUS SURGERY  2003    Battery removed from nose    NASAL TURBINATE REDUCTION Bilateral 11/15/2022    Procedure: REDUCTION, NASAL TURBINATE;  Surgeon: Matt Irvin MD;  Location: [facility] OR;  Service: ENT;  Laterality: Bilateral;  microdebrider    WISDOM TOOTH EXTRACTION       Family History   Problem Relation Age of Onset    Hypertension Mother     Heart disease Father     Lung disease Father     Asthma Father      Social History     Tobacco Use    Smoking status: Never     Passive exposure: Never    Smokeless tobacco: Never   Substance Use Topics    Alcohol use: Yes     Comment: occasionally     Drug use: Never     Review of Systems   Constitutional:  Negative for appetite change and fever.   HENT:  Positive for congestion. Negative for sore throat.    Respiratory:  Negative for shortness of breath.    Cardiovascular:  Negative for chest pain.   Gastrointestinal:  Positive for diarrhea and nausea. Negative for blood in stool and vomiting.   Genitourinary:  Negative for dysuria.   Musculoskeletal:  Negative for back pain and myalgias.   Skin:  Negative for rash.   Neurological:  Positive for dizziness. Negative for weakness.   Hematological:  Does not bruise/bleed easily.     Physical Exam     Initial Vitals [01/11/23 0917]   BP Pulse Resp Temp SpO2   (!) 140/84 94 17 98.8 °F (37.1 °C) 98 %      MAP       --         Physical Exam    Nursing note and vitals reviewed.  Constitutional: Vital signs are normal. He appears well-developed and well-nourished.   HENT:   Head: Normocephalic and atraumatic.   Right Ear: Tympanic membrane, external ear and ear canal normal. No middle ear effusion.   Left Ear: Tympanic membrane, external ear and ear canal normal.  No middle ear effusion.   Eyes:  Pupils are equal, round, and reactive to light. Right eye exhibits no nystagmus. Left eye exhibits no nystagmus.   Neck: Neck supple.   Cardiovascular:  Normal rate, regular rhythm, normal heart sounds and intact distal pulses.     Exam reveals no gallop and no friction rub.       No murmur heard.  Pulmonary/Chest: Breath sounds normal. He has no wheezes. He has no rhonchi. He has no rales.   Abdominal: Abdomen is soft. Bowel sounds are normal. There is no abdominal tenderness.   Musculoskeletal:      Cervical back: Neck supple.     Neurological: He is alert and oriented to person, place, and time. He has normal strength. No cranial nerve deficit or sensory deficit. Gait normal.   Cranial nerves III through XII grossly intact. 5/5 strength with intact sensation to BUE's and BLE's.   Skin: Skin is warm, dry and intact.   Psychiatric: He has a normal mood and affect. His speech is normal and behavior is normal.       ED Course   Procedures  Labs Reviewed   CBC W/ AUTO DIFFERENTIAL - Abnormal; Notable for the following components:       Result Value    Immature Granulocytes 1.8 (*)     Gran # (ANC) 8.8 (*)     Immature Grans (Abs) 0.22 (*)     Mono # 1.1 (*)     Lymph % 15.8 (*)     All other components within normal limits   COMPREHENSIVE METABOLIC PANEL - Abnormal; Notable for the following components:    ALT 60 (*)     All other components within normal limits   LIPASE          Imaging Results    None          Medications   sodium chloride 0.9% bolus 1,000 mL 1,000 mL (0 mLs Intravenous Stopped 1/11/23 1100)   ondansetron injection 8 mg (8 mg Intravenous Given 1/11/23 1000)     Medical Decision Making:   History:   Old Medical Records: I decided to obtain old medical records.  Old Records Summarized: records from clinic visits.       <> Summary of Records: External records reviewed showing:  The patient underwent septoplasty left posterior based septal flap closure of perforation, inferior turbinate reductions and  bilateral maxillary antrostomies on 11/15/22.  He messaged Dr. Irvin in Otolaryngology 4 days ago, secondary to chronic sinusitis, and was prescribed Doxycycline with 1st dose taken yesterday.    Differential Diagnosis:   Adverse medication reaction  Viral illness  Sinusitis  BPPV  Independently Interpreted Test(s):   I have ordered and independently interpreted EKG Reading(s) - see prior notes  Clinical Tests:   Lab Tests: Ordered and Reviewed  Medical Tests: Ordered and Reviewed     APC / Resident Notes:   Patient is a 23 y.o. male who presents to the ED 01/11/2023 who underwent emergent evaluation for dizziness and nausea.  He had sudden onset of this today.  He has a history of chronic sinusitis and currently is being treated by ENT for acute on chronic sinusitis.  Symptoms resolved with antiemetics in the emergency department.  He is normal neurological exam I do not think any central cause of his dizziness at this time.  I do not think emergent CT or MRI or other diagnostic studies indicated at this time.  Patient has benign abdominal exam.  I do not think any emergent intra-abdominal process such as obstruction, appendicitis, or pancreatitis.  Labs unremarkable.  Discussed possible adverse medication reaction given patient just started doxycycline versus BPPV.  Also discussed that patient has acute on chronic sinusitis may also be contributing to his dizziness and symptoms.  Will start doxycycline and change patient to Augmentin.  Recommend follow-up with his ENT. Based on my clinical evaluation, I do not appreciate any immediate, emergent, or life threatening condition or etiology that warrants additional workup today and feel that the patient can be discharged with close follow up care.  Follow up and return precautions discussed; patient verbalized understanding and is agreeable to plan of care. Patient discharged home in stable condition.              Scribe Attestation:   Scribe #1: I performed the  above scribed service and the documentation accurately describes the services I performed. I attest to the accuracy of the note.    Attending Attestation:           Physician Attestation for Scribe:  Physician Attestation Statement for Scribe #1: I, Brittany Hightower, reviewed documentation, as scribed by in my presence, and it is both accurate and complete.     Comments: I, ANTHONY Singh, personally performed the services described in this documentation. All medical record entries made by the scribe were at my direction and in my presence.  I have reviewed the chart and agree that the record reflects my personal performance and is accurate and complete. ANTHONY Singh.  12:51 PM 01/11/2023        ED Course as of 01/11/23 1255   Wed Jan 11, 2023   1004 EKG independently interpreted by me as rate 92 normal sinus rhythm with mild sinus arrhythmia normal axis and intervals no ST segment elevation or depression [JS]      ED Course User Index  [JS] Clinton Person MD                   Clinical Impression:   Final diagnoses:  [R42] Dizziness  [K52.9] Gastroenteritis (Primary)  [R11.2] Nausea and vomiting, unspecified vomiting type  [H81.10] Benign paroxysmal positional vertigo, unspecified laterality  [T88.7XXA] Non-dose-related adverse reaction to medication, initial encounter  [J32.9] Sinusitis, unspecified chronicity, unspecified location        ED Disposition Condition    Discharge Stable          ED Prescriptions       Medication Sig Dispense Start Date End Date Auth. Provider    fluticasone propionate (FLONASE) 50 mcg/actuation nasal spray 1 spray (50 mcg total) by Each Nostril route 2 (two) times daily as needed for Rhinitis. 15 g 1/11/2023 -- Brittany Hightower NP    amoxicillin-clavulanate 875-125mg (AUGMENTIN) 875-125 mg per tablet Take 1 tablet by mouth 2 (two) times daily. for 10 days 20 tablet 1/11/2023 1/21/2023 Brittany Hightower NP    ondansetron (ZOFRAN-ODT) 4 MG TbDL Take 1 tablet (4 mg total) by mouth every  6 (six) hours as needed (nausea or vomiting). 20 tablet 1/11/2023 -- Brittany Hightower NP          Follow-up Information       Follow up With Specialties Details Why Contact Info    Clinton Card MD Internal Medicine, Pediatrics In 2 days  [address] [place] [address] [phone]      [facility] Emergency Dept Emergency Medicine  As needed, If symptoms worsen [address] [place] [id number] [phone]             Brittany Hightower NP  01/11/23 1599

## 2023-01-12 ENCOUNTER — OFFICE VISIT (OUTPATIENT)
Dept: PULMONOLOGY | Facility: CLINIC | Age: 23
End: 2023-01-12
Payer: MEDICAID

## 2023-01-12 DIAGNOSIS — R09.89 CHRONIC SINUS COMPLAINTS: ICD-10-CM

## 2023-01-12 DIAGNOSIS — J45.50 SEVERE PERSISTENT ASTHMA WITHOUT COMPLICATION: ICD-10-CM

## 2023-01-12 DIAGNOSIS — J82.83 EOSINOPHILIC ASTHMA: Primary | ICD-10-CM

## 2023-01-12 PROCEDURE — 99213 OFFICE O/P EST LOW 20 MIN: CPT | Mod: 95,,, | Performed by: INTERNAL MEDICINE

## 2023-01-12 PROCEDURE — 99213 PR OFFICE/OUTPT VISIT, EST, LEVL III, 20-29 MIN: ICD-10-PCS | Mod: 95,,, | Performed by: INTERNAL MEDICINE

## 2023-01-12 RX ORDER — PREDNISONE 20 MG/1
TABLET ORAL
Qty: 27 TABLET | Refills: 1 | Status: SHIPPED | OUTPATIENT
Start: 2023-01-12 | End: 2023-06-06 | Stop reason: SDUPTHER

## 2023-01-12 NOTE — PATIENT INSTRUCTIONS
Use prednisone if asthma flares    Use advair and singulair    Use albuterol as needed.      Breathing test was normal.

## 2023-01-12 NOTE — PROGRESS NOTES
Established Patient - Audio Only Telehealth Visit     The patient location is: home   The chief complaint leading to consultation is: asthma   Visit type: Virtual visit with audio only (telephone)  Total time spent with patient: 5 min       The reason for the audio only service rather than synchronous audio and video virtual visit was related to technical difficulties or patient preference/necessity.     Each patient to whom I provide medical services by telemedicine is:  (1) informed of the relationship between the physician and patient and the respective role of any other health care provider with respect to management of the patient; and (2) notified that they may decline to receive medical services by telemedicine and may withdraw from such care at any time. Patient verbally consented to receive this service via voice-only telephone call.       1/12/2023    Erasto Wharton  New Patient Consult    No chief complaint on file.      HPI:       1/12/2023--did wellwith no asthma but used doxy for sinus with gi c/o ppt er visit yesterday and doxy changed to augmentin. Having increased wheeze vaguely.  Not clear if prednisone on hand. Had ent surg with failure to close perforation.   Had septal devation.          9/12/2022asthma all life, uses albuterol and nebs-- uses rescue 2/d with daily symptoms, went er within last yr with resp issues with severe bronchitis. Sinuses severe with sense smell and severe stuffy. Went to er yesterday right face swelling ppt steroids and amoxil  -- 3rd course abx last yr.      Uses zyrtec - was on flonase 2 each side daily--has nasal septal perforation seen  ent visit July 21,2022 and told to stop.   Pt has freq heartburn but no symptoms on omeprazole.  Pt had battery in nose 2003-- had to go to er-- watch battery had to pass into pharynx and remove from mouth.  .     Nocturnal arouses none.  Uses neb at bedtime.      Asa or nsaid not ppt flares.     Has eia.  Patient Instructions   Use  controller advair 220 2 twice daily with spacer as controller. Also use Singulair for sinus and asthma    Use rescue with albuterol 2-3 puffs every 4 hrs as needed. Or nebulizer.    Use prednisone 20 mg 2 daily for 3 then one daily-- may get by with one for 3 days - action plan      May use augmentin for sinus infection.  May need cultures.      Check breathing test once cleared.   Check blood count for eosinophils-- if need prednisone twice in a yr.      Phone follow up in 3-6 months -- may need special shots.          PFSH:  Past Medical History:   Diagnosis Date    Acid reflux     Chronic sinusitis, unspecified     Deviated septum     Mild intermittent asthma, uncomplicated     allergy induced asthma         Past Surgical History:   Procedure Laterality Date    CHOLECYSTECTOMY      LAPAROSCOPIC CHOLECYSTECTOMY  08/27/2020    LAPAROSCOPIC CHOLECYSTECTOMY  8/27/2020    Procedure: CHOLECYSTECTOMY, LAPAROSCOPIC;  Surgeon: Gian Peterson MD;  Location: [facility] OR;  Service: General;;  wilbur video confirmed 8/21/dme    NASAL SEPTOPLASTY N/A 11/15/2022    Procedure: SEPTOPLASTY, NOSE;  Surgeon: Matt Irvin MD;  Location: [facility] OR;  Service: ENT;  Laterality: N/A;  with flap closure of perforation    NASAL SINUS SURGERY  2003    Battery removed from nose    NASAL TURBINATE REDUCTION Bilateral 11/15/2022    Procedure: REDUCTION, NASAL TURBINATE;  Surgeon: Matt Irvin MD;  Location: [facility] OR;  Service: ENT;  Laterality: Bilateral;  microdebrider    WISDOM TOOTH EXTRACTION       Social History     Tobacco Use    Smoking status: Never     Passive exposure: Never    Smokeless tobacco: Never   Substance Use Topics    Alcohol use: Yes     Comment: occasionally     Drug use: Never     Family History   Problem Relation Age of Onset    Hypertension Mother     Heart disease Father     Lung disease Father     Asthma Father      Review of patient's allergies indicates:   Allergen Reactions    Benzoyl  peroxide Dermatitis, Hives, Itching, Rash and Swelling    Cat/feline products Shortness Of Breath    Doxycycline Other (See Comments)     gi    Grass pollen-june grass standard Shortness Of Breath          Review of Systems:  a review of eleven systems covering constitutional, Eye, HEENT, Psych, Respiratory, Cardiac, GI, , Musculoskeletal, Endocrine, Dermatologic was negative except for pertinent findings as listed ABOVE and below:  pertinent positive as above, rest is good       Exam:Comprehensive exam done. There were no vitals taken for this visit.  Exam included Vitals as listed, and patient's appearance and affect and alertness and mood, oral exam for yeast and hygiene and pharynx lesions and Mallapatti (M) score, neck with inspection for jvd and masses and thyroid abnormalities and lymph nodes (supraclavicular and infraclavicular nodes and axillary also examined and noted if abn), chest exam included symmetry and effort and fremitus and percussion and auscultation, cardiac exam included rhythm and gallops and murmur and rubs and jvd and edema, abdominal exam for mass and hepatosplenomegaly and tenderness and hernias and bowel sounds, Musculoskeletal exam with muscle tone and posture and mobility/gait and  strength, and skin for rashes and cyanosis and pallor and turgor, extremity for clubbing.  Findings were normal except for pertinent findings listed below:  M2, chest is symmetric, no distress, normal percussion, normal fremitus and good normal breath sounds       Radiographs (ct chest and cxr) reviewed: view by direct vision  jund 26, 2022 and 2020 cxr nad, no b ronchiectasis suggested.     Labs reviewed            PFT will be done and results to be reviewed       Plan:  Clinical impression is apparently straight forward and impression with management as below.    Diagnoses and all orders for this visit:    Eosinophilic asthma    Severe persistent asthma without complication  -     predniSONE  (DELTASONE) 20 MG tablet; Take 2 daily for 3 days then Take one daily for 3 days and may repeat for shortness of breath.    Chronic sinus complaints          Follow up in about 1 year (around 1/12/2024), or if symptoms worsen or fail to improve.    Discussed with patient above for education the following:      Patient Instructions   Use prednisone if asthma flares    Use advair and singulair    Use albuterol as needed.      Breathing test was normal.                            This service was not originating from a related E/M service provided within the previous 7 days nor will  to an E/M service or procedure within the next 24 hours or my soonest available appointment.  Prevailing standard of care was able to be met in this audio-only visit.

## 2023-01-24 ENCOUNTER — PATIENT MESSAGE (OUTPATIENT)
Dept: PRIMARY CARE CLINIC | Facility: CLINIC | Age: 23
End: 2023-01-24
Payer: MEDICAID

## 2023-02-01 ENCOUNTER — TELEPHONE (OUTPATIENT)
Dept: OTOLARYNGOLOGY | Facility: CLINIC | Age: 23
End: 2023-02-01
Payer: MEDICAID

## 2023-02-01 NOTE — TELEPHONE ENCOUNTER
Called pt and rescheduled follow up with Dr. Irvin. Pt could not come in tomorrow afternoon due to class. Rescheduled to Monday morning.  Thanks, Trinidad

## 2023-02-01 NOTE — TELEPHONE ENCOUNTER
----- Message from Latisha Springer sent at 2/1/2023 11:50 AM CST -----  Contact: patient  Type:  Sooner Apoointment Request      Name of Caller: patient     When is the first available appointment? N/a     Symptoms: sinus     Would the patient rather a call back or a response via [facility]? Call     Best Call Back Number:[phone] (home) [phone] (work)     Additional Information:

## 2023-02-06 ENCOUNTER — OFFICE VISIT (OUTPATIENT)
Dept: OTOLARYNGOLOGY | Facility: CLINIC | Age: 23
End: 2023-02-06
Payer: MEDICAID

## 2023-02-06 VITALS — TEMPERATURE: 98 F | WEIGHT: 242.75 LBS | BODY MASS INDEX: 35.95 KG/M2 | HEIGHT: 69 IN

## 2023-02-06 DIAGNOSIS — J34.89 PERFORATED NASAL SEPTUM: ICD-10-CM

## 2023-02-06 DIAGNOSIS — Z98.890 S/P FESS (FUNCTIONAL ENDOSCOPIC SINUS SURGERY): Primary | ICD-10-CM

## 2023-02-06 DIAGNOSIS — J32.9 RECURRENT SINUSITIS: ICD-10-CM

## 2023-02-06 PROCEDURE — 31231 NASAL/SINUS ENDOSCOPY: ICD-10-PCS | Mod: S$PBB,79,, | Performed by: OTOLARYNGOLOGY

## 2023-02-06 PROCEDURE — 1159F PR MEDICATION LIST DOCUMENTED IN MEDICAL RECORD: ICD-10-PCS | Mod: CPTII,,, | Performed by: OTOLARYNGOLOGY

## 2023-02-06 PROCEDURE — 99499 UNLISTED E&M SERVICE: CPT | Mod: S$PBB,,, | Performed by: OTOLARYNGOLOGY

## 2023-02-06 PROCEDURE — 31231 NASAL ENDOSCOPY DX: CPT | Mod: PBBFAC,PO | Performed by: OTOLARYNGOLOGY

## 2023-02-06 PROCEDURE — 1160F RVW MEDS BY RX/DR IN RCRD: CPT | Mod: CPTII,,, | Performed by: OTOLARYNGOLOGY

## 2023-02-06 PROCEDURE — 3008F BODY MASS INDEX DOCD: CPT | Mod: CPTII,,, | Performed by: OTOLARYNGOLOGY

## 2023-02-06 PROCEDURE — 1159F MED LIST DOCD IN RCRD: CPT | Mod: CPTII,,, | Performed by: OTOLARYNGOLOGY

## 2023-02-06 PROCEDURE — 99999 PR PBB SHADOW E&M-EST. PATIENT-LVL III: ICD-10-PCS | Mod: PBBFAC,,, | Performed by: OTOLARYNGOLOGY

## 2023-02-06 PROCEDURE — 99213 OFFICE O/P EST LOW 20 MIN: CPT | Mod: PBBFAC,PO,25 | Performed by: OTOLARYNGOLOGY

## 2023-02-06 PROCEDURE — 99499 NO LOS: ICD-10-PCS | Mod: S$PBB,,, | Performed by: OTOLARYNGOLOGY

## 2023-02-06 PROCEDURE — 99999 PR PBB SHADOW E&M-EST. PATIENT-LVL III: CPT | Mod: PBBFAC,,, | Performed by: OTOLARYNGOLOGY

## 2023-02-06 PROCEDURE — 1160F PR REVIEW ALL MEDS BY PRESCRIBER/CLIN PHARMACIST DOCUMENTED: ICD-10-PCS | Mod: CPTII,,, | Performed by: OTOLARYNGOLOGY

## 2023-02-06 PROCEDURE — 3008F PR BODY MASS INDEX (BMI) DOCUMENTED: ICD-10-PCS | Mod: CPTII,,, | Performed by: OTOLARYNGOLOGY

## 2023-02-06 RX ORDER — CETIRIZINE HYDROCHLORIDE 10 MG/1
10 TABLET ORAL
COMMUNITY
Start: 2023-01-26 | End: 2023-10-30

## 2023-02-06 NOTE — PROGRESS NOTES
"Follow-up (status post septoplasty left posterior based septal flap closure of perforation, inferior turbinate reductions and bilateral maxillary antrostomies on 11/15/22. States is doing better, states that there is "a cold going around his house". States inside nose feels dry (did just rinse the sinuses out prior to coming in today). )    < 3 month followup atatus post septoplasty with failed attempt at large perforation closure and bilateral maxillary antrostomy/balloon dilation.  At 2 week follow-up perforation had failed to close and there was substantial granulation tissue bilaterally patient not using nasal steroids but was using saline irrigations and had been started on antibiotics for sinus infection by others.  Some residual right high septal deviation noted in supportive patient's complaint of ongoing right-sided greater than left nasal congestion.  Endoscopic debridement performed and started on budesonide at a high concentration as nasal drops twice daily in addition to saline and rinses.      He returns today with continued improvement using his twice daily rinses with budesonide in the rinse.  He denies any nasal congestion.  He has had a couple of head colds neither of which progressed to sinus infection he is breathing well through the nose without complaint.  No bleeding.  No whistling..  He denies any facial pressure or pain specifically no right cheek pressure and no active purulence.    Exam     Residual high right septal deviation on anterior exam but patent airway bilaterally.  Some mild dryness and spotty blood but no crusting or ulceration more of the turbinates then septum.  Somewhat on the right septum more on the left inferior turbinate    Endoscopy               Impression     Residual perforation remains asymptomatic.  Patient has had some recurrent viral infections but no evidence of active polyp disease though there is edema and scarring from limited maxillary balloon " dilation/antrostomies.  He is continuing with budesonide irrigations twice daily with control of symptoms    Recommendation    I have asked Erasto to decrease his budesonide irrigations in [facility] to once daily with a 2nd rinse with standard salt soda packet only.  Over the course of the next few weeks he will start to decrease this to every other day adding in once daily over-the-counter nasal steroids in the hopes that over the course of the next month he will get off of the budesonide irrigations and move towards twice daily irrigations WITHOUT BUDESONIDE followed by 1-2 sprays each side towards the ear of prescription strength over-the-counter nasal steroid such as Rhinocort/budesonide, Nasacort/triamcinolone, Flonase/fluticasone etc..  Patient will follow up with any concerns including progressive congestion, facial pressure or pain, recurrent infections all of which could be an indication for more extensive sinus surgery with or without revision of the residual high right septal deviation.  No further attempts at septal closure will be recommended.

## 2023-02-06 NOTE — PATIENT INSTRUCTIONS
decrease budesonide irrigations in [facility] to once daily with a 2nd rinse with standard salt soda packet only.  Over the course of the next few weeks start to decrease this to every other day adding in once daily over-the-counter nasal steroids in the hopes that over the course of the next month getting off of the budesonide irrigations and moving towards twice daily irrigations WITHOUT BUDESONIDE followed by 1-2 sprays each side towards the ear of prescription strength over-the-counter nasal steroid such as Rhinocort/budesonide, Nasacort/triamcinolone, Flonase/fluticasone etc..  Follow up with any concerns including progressive congestion, facial pressure or pain, recurrent infections all of which could be an indication for more extensive sinus surgery       NASAL SALINE    Still saline comes in many preparations including sprays/mists, gels, and rinses.  Different preparations served different purposes.  Saline spray helps to briefly moisturize the nose and help clear mucus.  Saline gels coat the nose for longer protective benefit of keeping the linings the nose moist.  Saline rinses clear the nose and sinuses and a more thorough way in her best used for significant postnasal drip and sinus complaints.  A combination of saline sprays/mists, gels and rinses should be used to address routine nasal clearing and dryness issues as well as flushing for better control of allergy and postnasal drip symptoms.  There is no real risk of over use of nasal saline products.  Saline sprays do not have any of the potential rebound or addiction of nasal decongestant sprays.  Nasal saline sprays and rinses should be used prior to the application of any medicated nasal sprays such as nasal steroids or nasal antihistamine sprays.        INTRANASAL STEROID SPRAYS      Intranasal steroid sprays are available both by prescription and over-the-counter both in generic and brand name preparations.  They are all very similar in efficacy  and side effect profiles.  Over-the-counter and prescription intranasal steroids include fluticasone propionate (Flonase), fluticasone furoate (Sensimist), triamcinolone (Nasacort), and budesonide (Rhinocort).  While these medications are available as prescriptions as well there are few nasal steroids in her available by prescription only and include mometasone (Nasonex), flunisolide (Nasarel), and beclomethasone (QNASL).    Nasal steroids or the foundation of treatment of both allergy and other inflammatory conditions of the nose and sinuses.  They are safe for regular use and while there are many side effects listed most of these are steroid class effects and not typically encountered.  Typical side effects include dryness and even ulceration and bleeding of the nose.  These side effects can be minimized by proper application and proper moisturization with saline and saline gel.    Sometimes changing between 1 brand of nasal steroid and another can result in improved control of symptoms especially after long term use of one particular nasal steroid.    Proper application of the nasal steroid spray is accomplished by spraying towards the I/ear on the same side with the tip of the superior just barely inside the nostril with the chin slightly downward.  Any dripping should be gently inhaled not sniff test backwards into the throat.  Labeled instructions should be followed.        Return with any worsening of symptoms, failure to improve, or any other concerns for further evaluation and treatment.

## 2023-02-06 NOTE — PROCEDURES
"Nasal/sinus endoscopy    Date/Time: 2/6/2023 8:00 AM    Time out: Immediately prior to procedure a "time out" was called to verify the correct patient, procedure, equipment, support staff and site/side marked as required.  Performed by: Matt Irvin MD  Authorized by: Matt Irvin MD     Consent Done?:  Yes (Verbal)  Anesthesia:     Local anesthetic:  None    Location details:  Left nostril    Local Atomizer?      Type of Endoscope:  Flexible  Nose:     Procedure Performed:  Nasal Endoscopy  Nasopharynx:      Video disposable Storz flexible scope used with findings as per office note and photo documentation.  No complications  "

## 2023-02-07 ENCOUNTER — PATIENT MESSAGE (OUTPATIENT)
Dept: PRIMARY CARE CLINIC | Facility: CLINIC | Age: 23
End: 2023-02-07
Payer: MEDICAID

## 2023-02-07 DIAGNOSIS — K21.9 GASTROESOPHAGEAL REFLUX DISEASE WITHOUT ESOPHAGITIS: Primary | ICD-10-CM

## 2023-03-14 DIAGNOSIS — J45.50 SEVERE PERSISTENT ASTHMA WITHOUT COMPLICATION: ICD-10-CM

## 2023-03-14 RX ORDER — MONTELUKAST SODIUM 10 MG/1
10 TABLET ORAL NIGHTLY
Qty: 30 TABLET | Refills: 11 | Status: CANCELLED | OUTPATIENT
Start: 2023-03-14

## 2023-03-24 ENCOUNTER — PATIENT MESSAGE (OUTPATIENT)
Dept: ALLERGY | Facility: CLINIC | Age: 23
End: 2023-03-24
Payer: MEDICAID

## 2023-03-27 DIAGNOSIS — H10.45 CHRONIC ALLERGIC CONJUNCTIVITIS: Primary | ICD-10-CM

## 2023-03-27 RX ORDER — KETOROLAC TROMETHAMINE 5 MG/ML
1 SOLUTION OPHTHALMIC 4 TIMES DAILY PRN
Qty: 10 ML | Refills: 1 | Status: SHIPPED | OUTPATIENT
Start: 2023-03-27

## 2023-03-27 RX ORDER — KETOROLAC TROMETHAMINE 5 MG/ML
1 SOLUTION OPHTHALMIC 4 TIMES DAILY PRN
Qty: 10 ML | Refills: 1 | Status: SHIPPED | OUTPATIENT
Start: 2023-03-27 | End: 2023-03-27

## 2023-03-27 NOTE — PROGRESS NOTES
# [facility]    Pt requested Rx eye drops for itchy eyes not relieved by rewetting drops.  Rx'd ketorolac.

## 2023-06-06 DIAGNOSIS — J45.50 SEVERE PERSISTENT ASTHMA WITHOUT COMPLICATION: ICD-10-CM

## 2023-06-06 DIAGNOSIS — K21.9 GASTROESOPHAGEAL REFLUX DISEASE WITHOUT ESOPHAGITIS: Primary | ICD-10-CM

## 2023-06-06 RX ORDER — OMEPRAZOLE 40 MG/1
40 CAPSULE, DELAYED RELEASE ORAL DAILY
Qty: 90 CAPSULE | Refills: 0 | Status: SHIPPED | OUTPATIENT
Start: 2023-06-06 | End: 2024-06-05

## 2023-06-06 RX ORDER — PREDNISONE 20 MG/1
TABLET ORAL
Qty: 27 TABLET | Refills: 1 | Status: SHIPPED | OUTPATIENT
Start: 2023-06-06 | End: 2023-06-08 | Stop reason: SDUPTHER

## 2023-06-06 NOTE — TELEPHONE ENCOUNTER
Care Due:                  Date            Visit Type   Department     Provider  --------------------------------------------------------------------------------                                [facility]      [facility] [place] [facility]  Last Visit: 07-      AUDIO ONLY   PRIMARY CARE   Clinton Card  Next Visit: None Scheduled  None         None Found                                                            Last  Test          Frequency    Reason                     Performed    Due Date  --------------------------------------------------------------------------------    Office Visit  12 months..  omeprazole...............  07- 07-    Health [facility] Embedded Care Due Messages. Reference number: [id number].   6/06/2023 12:14:11 AM CDT

## 2023-06-07 RX ORDER — ALBUTEROL SULFATE 90 UG/1
2 AEROSOL, METERED RESPIRATORY (INHALATION) EVERY 4 HOURS PRN
Qty: 36 G | Refills: 11 | Status: SHIPPED | OUTPATIENT
Start: 2023-06-07

## 2023-06-08 ENCOUNTER — OFFICE VISIT (OUTPATIENT)
Dept: PULMONOLOGY | Facility: CLINIC | Age: 23
End: 2023-06-08
Payer: MEDICAID

## 2023-06-08 VITALS
WEIGHT: 233.69 LBS | BODY MASS INDEX: 34.61 KG/M2 | OXYGEN SATURATION: 99 % | HEART RATE: 97 BPM | HEIGHT: 69 IN | SYSTOLIC BLOOD PRESSURE: 144 MMHG | DIASTOLIC BLOOD PRESSURE: 88 MMHG

## 2023-06-08 DIAGNOSIS — J45.50 SEVERE PERSISTENT ASTHMA WITHOUT COMPLICATION: ICD-10-CM

## 2023-06-08 DIAGNOSIS — J32.9 RECURRENT SINUS INFECTIONS: ICD-10-CM

## 2023-06-08 DIAGNOSIS — J82.83 EOSINOPHILIC ASTHMA: ICD-10-CM

## 2023-06-08 DIAGNOSIS — R09.89 CHRONIC SINUS COMPLAINTS: Primary | ICD-10-CM

## 2023-06-08 PROCEDURE — 99213 PR OFFICE/OUTPT VISIT, EST, LEVL III, 20-29 MIN: ICD-10-PCS | Mod: S$PBB,,, | Performed by: INTERNAL MEDICINE

## 2023-06-08 PROCEDURE — 3077F SYST BP >= 140 MM HG: CPT | Mod: CPTII,,, | Performed by: INTERNAL MEDICINE

## 2023-06-08 PROCEDURE — 3008F PR BODY MASS INDEX (BMI) DOCUMENTED: ICD-10-PCS | Mod: CPTII,,, | Performed by: INTERNAL MEDICINE

## 2023-06-08 PROCEDURE — 99213 OFFICE O/P EST LOW 20 MIN: CPT | Mod: S$PBB,,, | Performed by: INTERNAL MEDICINE

## 2023-06-08 PROCEDURE — 99213 OFFICE O/P EST LOW 20 MIN: CPT | Mod: PBBFAC,PO | Performed by: INTERNAL MEDICINE

## 2023-06-08 PROCEDURE — 3079F DIAST BP 80-89 MM HG: CPT | Mod: CPTII,,, | Performed by: INTERNAL MEDICINE

## 2023-06-08 PROCEDURE — 3077F PR MOST RECENT SYSTOLIC BLOOD PRESSURE >= 140 MM HG: ICD-10-PCS | Mod: CPTII,,, | Performed by: INTERNAL MEDICINE

## 2023-06-08 PROCEDURE — 3079F PR MOST RECENT DIASTOLIC BLOOD PRESSURE 80-89 MM HG: ICD-10-PCS | Mod: CPTII,,, | Performed by: INTERNAL MEDICINE

## 2023-06-08 PROCEDURE — 3008F BODY MASS INDEX DOCD: CPT | Mod: CPTII,,, | Performed by: INTERNAL MEDICINE

## 2023-06-08 PROCEDURE — 1159F PR MEDICATION LIST DOCUMENTED IN MEDICAL RECORD: ICD-10-PCS | Mod: CPTII,,, | Performed by: INTERNAL MEDICINE

## 2023-06-08 PROCEDURE — 1159F MED LIST DOCD IN RCRD: CPT | Mod: CPTII,,, | Performed by: INTERNAL MEDICINE

## 2023-06-08 PROCEDURE — 99999 PR PBB SHADOW E&M-EST. PATIENT-LVL III: ICD-10-PCS | Mod: PBBFAC,,, | Performed by: INTERNAL MEDICINE

## 2023-06-08 PROCEDURE — 99999 PR PBB SHADOW E&M-EST. PATIENT-LVL III: CPT | Mod: PBBFAC,,, | Performed by: INTERNAL MEDICINE

## 2023-06-08 RX ORDER — METHYLPHENIDATE HYDROCHLORIDE 40 MG/1
40 CAPSULE, EXTENDED RELEASE ORAL EVERY MORNING
COMMUNITY
Start: 2023-05-23

## 2023-06-08 RX ORDER — PREDNISONE 20 MG/1
TABLET ORAL
Qty: 27 TABLET | Refills: 1 | Status: SHIPPED | OUTPATIENT
Start: 2023-06-08

## 2023-06-08 RX ORDER — AMOXICILLIN 875 MG/1
875 TABLET, FILM COATED ORAL EVERY 12 HOURS
Qty: 28 TABLET | Refills: 3 | Status: SHIPPED | OUTPATIENT
Start: 2023-06-08 | End: 2023-10-30

## 2023-06-08 RX ORDER — MOMETASONE FUROATE AND FORMOTEROL FUMARATE DIHYDRATE 200; 5 UG/1; UG/1
2 AEROSOL RESPIRATORY (INHALATION) 2 TIMES DAILY
Qty: 13 G | Refills: 11 | Status: SHIPPED | OUTPATIENT
Start: 2023-06-08

## 2023-06-08 NOTE — PROGRESS NOTES
6/8/2023    Erasto Wharton  New Patient Consult    Chief Complaint   Patient presents with    Shortness of Breath    Chest Pain     Chest tightness       HPI:       6/8/2023 pt uses adviar q am, used prednisone 4times last yr, exacerbates with sinus  infections. Had albuterol needs 1-2 /wk. No noct arousal when not exacerbating    Pt feels exacerbations controlled well with steroid courses.    Uses ibuproen for migranes -- no worsening sinus nor lungs...    1/12/2023--did wellwith no asthma but used doxy for sinus with gi c/o ppt er visit yesterday and doxy changed to augmentin. Having increased wheeze vaguely.  Not clear if prednisone on hand. Had ent surg with failure to close perforation.   Had septal devation.            9/12/2022asthma all life, uses albuterol and nebs-- uses rescue 2/d with daily symptoms, went er within last yr with resp issues with severe bronchitis. Sinuses severe with sense smell and severe stuffy. Went to er yesterday right face swelling ppt steroids and amoxil  -- 3rd course abx last yr.      Uses zyrtec - was on flonase 2 each side daily--has nasal septal perforation seen  ent visit July 21,2022 and told to stop.   Pt has freq heartburn but no symptoms on omeprazole.  Pt had battery in nose 2003-- had to go to er-- watch battery had to pass into pharynx and remove from mouth.  .     Nocturnal arouses none.  Uses neb at bedtime.      Asa or nsaid not ppt flares.     Has eia.  Patient Instructions   Use controller advair 220 2 twice daily with spacer as controller. Also use Singulair for sinus and asthma    Use rescue with albuterol 2-3 puffs every 4 hrs as needed. Or nebulizer.    Use prednisone 20 mg 2 daily for 3 then one daily-- may get by with one for 3 days - action plan      May use augmentin for sinus infection.  May need cultures.      Check breathing test once cleared.   Check blood count for eosinophils-- if need prednisone twice in a yr.      Phone follow up in 3-6  months -- may need special shots.          PFSH:  Past Medical History:   Diagnosis Date    Acid reflux     Chronic sinusitis, unspecified     Deviated septum     Mild intermittent asthma, uncomplicated     allergy induced asthma         Past Surgical History:   Procedure Laterality Date    CHOLECYSTECTOMY      LAPAROSCOPIC CHOLECYSTECTOMY  08/27/2020    LAPAROSCOPIC CHOLECYSTECTOMY  8/27/2020    Procedure: CHOLECYSTECTOMY, LAPAROSCOPIC;  Surgeon: Gian Peterson MD;  Location: [facility] OR;  Service: General;;  wilbur video confirmed 8/21/dme    NASAL SEPTOPLASTY N/A 11/15/2022    Procedure: SEPTOPLASTY, NOSE;  Surgeon: Matt Irvin MD;  Location: [facility] OR;  Service: ENT;  Laterality: N/A;  with flap closure of perforation    NASAL SINUS SURGERY  2003    Battery removed from nose    NASAL TURBINATE REDUCTION Bilateral 11/15/2022    Procedure: REDUCTION, NASAL TURBINATE;  Surgeon: Matt Irvin MD;  Location: [facility] OR;  Service: ENT;  Laterality: Bilateral;  microdebrider    WISDOM TOOTH EXTRACTION       Social History     Tobacco Use    Smoking status: Never     Passive exposure: Never    Smokeless tobacco: Never   Substance Use Topics    Alcohol use: Yes     Comment: occasionally     Drug use: Never     Family History   Problem Relation Age of Onset    Hypertension Mother     Heart disease Father     Lung disease Father     Asthma Father      Review of patient's allergies indicates:   Allergen Reactions    Benzoyl peroxide Dermatitis, Hives, Itching, Rash and Swelling    Cat/feline products Shortness Of Breath    Doxycycline Other (See Comments)     gi    Grass pollen-june grass standard Shortness Of Breath          Review of Systems:  a review of eleven systems covering constitutional, Eye, HEENT, Psych, Respiratory, Cardiac, GI, , Musculoskeletal, Endocrine, Dermatologic was negative except for pertinent findings as listed ABOVE and below:  pertinent positive as above, rest is good    "    Exam:Comprehensive exam done. BP (!) 144/88 (BP Location: Left arm, Patient Position: Sitting, BP Method: Medium (Automatic))   Pulse 97   Ht 5' 9" (1.753 m)   Wt 106 kg (233 lb 11 oz)   SpO2 99% Comment: room air at rest  BMI 34.51 kg/m²   Exam included Vitals as listed, and patient's appearance and affect and alertness and mood, oral exam for yeast and hygiene and pharynx lesions and Mallapatti (M) score, neck with inspection for jvd and masses and thyroid abnormalities and lymph nodes (supraclavicular and infraclavicular nodes and axillary also examined and noted if abn), chest exam included symmetry and effort and fremitus and percussion and auscultation, cardiac exam included rhythm and gallops and murmur and rubs and jvd and edema, abdominal exam for mass and hepatosplenomegaly and tenderness and hernias and bowel sounds, Musculoskeletal exam with muscle tone and posture and mobility/gait and  strength, and skin for rashes and cyanosis and pallor and turgor, extremity for clubbing.  Findings were normal except for pertinent findings listed below:  M2, chest is symmetric, no distress, normal percussion, normal fremitus and good normal breath sounds       Radiographs (ct chest and cxr) reviewed: view by direct vision  jund 26, 2022 and 2020 cxr nad, no b ronchiectasis suggested.     Labs reviewed            PFT  Spirometry with bronchodilator, lung volume by gas dilution, diffusion capacity were performed January 9, 2023. Spirometry was normal. There was no significant bronchodilator response. Lung volumes are normal. Diffusion is normal. Overall impression is   normal study. Clinical correlation recommended.       Plan:  Clinical impression is apparently straight forward and impression with management as below.    Erasto was seen today for shortness of breath and chest pain.    Diagnoses and all orders for this visit:    Chronic sinus complaints  -     amoxicillin (AMOXIL) 875 MG tablet; Take 1 " tablet (875 mg total) by mouth every 12 (twelve) hours.    Severe persistent asthma without complication  -     predniSONE (DELTASONE) 20 MG tablet; Take 2 daily for 3 days then Take one daily for 3 days and may repeat for shortness of breath.  -     mometasone-formoterol (DULERA) 200-5 mcg/actuation inhaler; Inhale 2 puffs into the lungs 2 (two) times daily. Controller  -     IgG; Future  -     IGG 1, 2, 3, AND 4; Future  -     IGM; Future  -     Humoral Immune Eval (Pneumo Serotypes) With H. Flu; Future    Eosinophilic asthma    Recurrent sinus infections  -     IgG; Future  -     IGG 1, 2, 3, AND 4; Future  -     IGM; Future  -     Humoral Immune Eval (Pneumo Serotypes) With H. Flu; Future            Follow up in about 6 months (around 12/8/2023), or if symptoms worsen or fail to improve.    Discussed with patient above for education the following:      Patient Instructions   May need biologic therapy    Use controller -- advair or dulera 2 puffs twice daily    Use albuterol -- as needed, if not working well or lasting- may indicate needs action plan    Use prednisone aciton plan--since sinus infection felt to be common cause -- may need amoxil     Use amoxil to clear sinus infections-- repeat as needed.   Use singulair....

## 2023-06-08 NOTE — PATIENT INSTRUCTIONS
May need biologic therapy    Use controller -- advair or dulera 2 puffs twice daily    Use albuterol -- as needed, if not working well or lasting- may indicate needs action plan    Use prednisone aciton plan--since sinus infection felt to be common cause -- may need amoxil     Use amoxil to clear sinus infections-- repeat as needed.   Use singulair....

## 2023-09-18 ENCOUNTER — PATIENT MESSAGE (OUTPATIENT)
Dept: PRIMARY CARE CLINIC | Facility: CLINIC | Age: 23
End: 2023-09-18
Payer: MEDICAID

## 2023-10-18 ENCOUNTER — PATIENT MESSAGE (OUTPATIENT)
Dept: CARDIOLOGY | Facility: CLINIC | Age: 23
End: 2023-10-18
Payer: COMMERCIAL

## 2023-10-26 ENCOUNTER — OFFICE VISIT (OUTPATIENT)
Dept: PRIMARY CARE CLINIC | Facility: CLINIC | Age: 23
End: 2023-10-26
Payer: COMMERCIAL

## 2023-10-26 VITALS
HEIGHT: 69 IN | RESPIRATION RATE: 18 BRPM | SYSTOLIC BLOOD PRESSURE: 124 MMHG | DIASTOLIC BLOOD PRESSURE: 76 MMHG | HEART RATE: 92 BPM | BODY MASS INDEX: 34.22 KG/M2 | WEIGHT: 231.06 LBS | OXYGEN SATURATION: 99 %

## 2023-10-26 DIAGNOSIS — Z00.00 ANNUAL PHYSICAL EXAM: ICD-10-CM

## 2023-10-26 DIAGNOSIS — Z23 ENCOUNTER FOR VACCINATION: Primary | ICD-10-CM

## 2023-10-26 DIAGNOSIS — R74.01 ELEVATED ALT MEASUREMENT: ICD-10-CM

## 2023-10-26 DIAGNOSIS — J45.20 MILD INTERMITTENT ASTHMA, UNSPECIFIED WHETHER COMPLICATED: ICD-10-CM

## 2023-10-26 PROCEDURE — 90686 FLU VACCINE (QUAD) GREATER THAN OR EQUAL TO 3YO PRESERVATIVE FREE IM: ICD-10-PCS | Mod: S$GLB,,, | Performed by: INTERNAL MEDICINE

## 2023-10-26 PROCEDURE — 99999 PR PBB SHADOW E&M-EST. PATIENT-LVL IV: CPT | Mod: PBBFAC,,, | Performed by: INTERNAL MEDICINE

## 2023-10-26 PROCEDURE — 90686 IIV4 VACC NO PRSV 0.5 ML IM: CPT | Mod: S$GLB,,, | Performed by: INTERNAL MEDICINE

## 2023-10-26 PROCEDURE — 99213 PR OFFICE/OUTPT VISIT, EST, LEVL III, 20-29 MIN: ICD-10-PCS | Mod: 25,S$GLB,, | Performed by: INTERNAL MEDICINE

## 2023-10-26 PROCEDURE — 99213 OFFICE O/P EST LOW 20 MIN: CPT | Mod: 25,S$GLB,, | Performed by: INTERNAL MEDICINE

## 2023-10-26 PROCEDURE — 99999 PR PBB SHADOW E&M-EST. PATIENT-LVL IV: ICD-10-PCS | Mod: PBBFAC,,, | Performed by: INTERNAL MEDICINE

## 2023-10-26 PROCEDURE — 90471 FLU VACCINE (QUAD) GREATER THAN OR EQUAL TO 3YO PRESERVATIVE FREE IM: ICD-10-PCS | Mod: S$GLB,,, | Performed by: INTERNAL MEDICINE

## 2023-10-26 PROCEDURE — 90471 IMMUNIZATION ADMIN: CPT | Mod: S$GLB,,, | Performed by: INTERNAL MEDICINE

## 2023-10-31 ENCOUNTER — PATIENT MESSAGE (OUTPATIENT)
Dept: PRIMARY CARE CLINIC | Facility: CLINIC | Age: 23
End: 2023-10-31
Payer: COMMERCIAL

## 2023-10-31 NOTE — TELEPHONE ENCOUNTER
No care due was identified.  [facility] Embedded Care Due Messages. Reference number: [id number].   10/31/2023 1:28:38 PM CDT

## 2023-10-31 NOTE — TELEPHONE ENCOUNTER
Refill Routing Note   Medication(s) are not appropriate for processing by [facility] for the following reason(s):      No active prescription written by provider    [facility] action(s):  Defer Care Due:  None identified            Appointments  past 12m or future 3m with PCP    Date Provider   Last Visit   10/26/2023 Clinton Card MD   Next Visit   Visit date not found Clinton Card MD   ED visits in past 90 days: 0        Note composed:1:34 PM 10/31/2023

## 2023-10-31 NOTE — PROGRESS NOTES
Subjective:       Patient ID: Erasto Wharton is a 23 y.o. male.    Chief Complaint: Employment Physical    HPI  pt visit today for physical for work he has h/o mild asthma controlled with inhalerDMI prn and chronic sinusitis allergy no sob cp DANIELS no fever chill night sweat wt loss he does not smoke and has social ETOH   Review of Systems   Constitutional:  Negative for unexpected weight change.   Respiratory:  Negative for shortness of breath.         H/o asthma   Cardiovascular:  Negative for chest pain.   Gastrointestinal:  Negative for abdominal pain.   Musculoskeletal:  Negative for arthralgias and back pain.   Psychiatric/Behavioral:  Negative for dysphoric mood.        Objective:      Physical Exam  Vitals and nursing note reviewed.   Constitutional:       General: He is not in acute distress.     Appearance: He is well-developed.   HENT:      Head: Normocephalic and atraumatic.      Right Ear: External ear normal.      Left Ear: External ear normal.      Nose: Congestion present.      Comments: Septal deviation     Mouth/Throat:      Pharynx: No oropharyngeal exudate.   Eyes:      Extraocular Movements: Extraocular movements intact.      Conjunctiva/sclera: Conjunctivae normal.      Pupils: Pupils are equal, round, and reactive to light.   Neck:      Thyroid: No thyromegaly.   Cardiovascular:      Rate and Rhythm: Normal rate and regular rhythm.      Heart sounds: Normal heart sounds. No murmur heard.     No friction rub. No gallop.   Pulmonary:      Effort: Pulmonary effort is normal. No respiratory distress.      Breath sounds: Normal breath sounds. No wheezing.   Abdominal:      General: Bowel sounds are normal. There is no distension.      Palpations: Abdomen is soft.      Tenderness: There is no abdominal tenderness.   Musculoskeletal:         General: No tenderness or deformity. Normal range of motion.      Cervical back: Normal range of motion and neck supple.   Lymphadenopathy:      Cervical: No  cervical adenopathy.   Skin:     General: Skin is warm and dry.      Capillary Refill: Capillary refill takes less than 2 seconds.      Findings: No erythema or rash.   Neurological:      Mental Status: He is alert and oriented to person, place, and time.   Psychiatric:         Thought Content: Thought content normal.         Judgment: Judgment normal.         Assessment:       1. Encounter for vaccination    2. Mild intermittent asthma, unspecified whether complicated    3. Annual physical exam    4. Elevated ALT measurement        Plan:       Encounter for vaccination  -     Influenza - Quadrivalent *Preferred* (6 months+) (PF)    Mild intermittent asthma, unspecified whether complicated  Comments:  continue with albuterol MDI    Annual physical exam  Comments:  physically normal except h/o asthma has to make albuterol MDI prn    Elevated ALT measurement  Comments:  resolved with last blood test        Medication List with Changes/Refills   Current Medications    ALBUTEROL (PROVENTIL/VENTOLIN HFA) 90 MCG/ACTUATION INHALER    Inhale 2 puffs into the lungs every 4 (four) hours as needed for Shortness of Breath or Wheezing. Rescue    ALBUTEROL SULFATE 2.5 MG/0.5 ML NEBU    INHA E 1 VIAL BY NEBULIZATION EVERY 4 (FOUR) HOURS AS NEEDED (FOR WHEEZING AND SOB). RESCUE    FLUTICASONE PROPIONATE (FLONASE) 50 MCG/ACTUATION NASAL SPRAY    1 spray (50 mcg total) by Each Nostril route 2 (two) times daily as needed for Rhinitis.    IBUPROFEN (ADVIL,MOTRIN) 800 MG TABLET    Take 1 tablet (800 mg total) by mouth 3 (three) times daily.    KETOROLAC 0.5% (ACULAR) 0.5 % DROP    Place 1 drop into both eyes 4 (four) times daily as needed (eye itching).    METHYLPHENIDATE HCL (RITALIN LA) 40 MG 24 HR CAPSULE    Take 40 mg by mouth every morning.    MOMETASONE-FORMOTEROL (DULERA) 200-5 MCG/ACTUATION INHALER    Inhale 2 puffs into the lungs 2 (two) times daily. Controller    MONTELUKAST (SINGULAIR) 10 MG TABLET    Take 1 tablet (10 mg  total) by mouth every evening.    OMEPRAZOLE (PRILOSEC) 40 MG CAPSULE    Take 1 capsule (40 mg total) by mouth once daily.    ONDANSETRON (ZOFRAN-ODT) 4 MG TBDL    Take 1 tablet (4 mg total) by mouth every 6 (six) hours as needed (nausea or vomiting).    PREDNISONE (DELTASONE) 20 MG TABLET    Take 2 daily for 3 days then Take one daily for 3 days and may repeat for shortness of breath.   Discontinued Medications    AMOXICILLIN (AMOXIL) 875 MG TABLET    Take 1 tablet (875 mg total) by mouth every 12 (twelve) hours.    BUDESONIDE 1 MG/2 ML NBSP    BID as instructed    CETIRIZINE (ZYRTEC) 10 MG TABLET    Take 10 mg by mouth.    MUPIROCIN (BACTROBAN) 2 % OINTMENT    1-2 inches in sinus rinses BID for 1 week

## 2023-11-01 RX ORDER — FLUTICASONE PROPIONATE 50 MCG
1 SPRAY, SUSPENSION (ML) NASAL 2 TIMES DAILY PRN
Qty: 15 G | Refills: 0 | Status: SHIPPED | OUTPATIENT
Start: 2023-11-01 | End: 2023-11-01 | Stop reason: SDUPTHER

## 2023-11-01 RX ORDER — FLUTICASONE PROPIONATE 50 MCG
1 SPRAY, SUSPENSION (ML) NASAL 2 TIMES DAILY PRN
Qty: 16 G | Refills: 0 | Status: SHIPPED | OUTPATIENT
Start: 2023-11-01 | End: 2023-11-24

## 2023-11-01 NOTE — TELEPHONE ENCOUNTER
" Refill Decision Note   Erasto Wharton  is requesting a refill authorization.  Brief Assessment and Rationale for Refill:  Approve     Medication Therapy Plan:  Signed today but noted in chart as a "PRINT" and no pharmacy listed. Per patient portal message, resent to [facility]. Duplicate <7days      Comments:     Note composed:3:15 PM 11/01/2023           "

## 2023-11-01 NOTE — TELEPHONE ENCOUNTER
No care due was identified.  [facility] Embedded Care Due Messages. Reference number: [id number].   11/01/2023 11:43:18 AM CDT

## 2023-11-24 RX ORDER — FLUTICASONE PROPIONATE 50 MCG
SPRAY, SUSPENSION (ML) NASAL
Qty: 48 ML | Refills: 3 | Status: SHIPPED | OUTPATIENT
Start: 2023-11-24

## 2023-11-24 NOTE — TELEPHONE ENCOUNTER
No care due was identified.  [facility] Embedded Care Due Messages. Reference number: [id number].   11/24/2023 1:11:02 PM CST

## 2023-11-25 NOTE — TELEPHONE ENCOUNTER
Refill Decision Note   Erasto Wharton  is requesting a refill authorization.  Brief Assessment and Rationale for Refill:  Approve     Medication Therapy Plan:         Comments:     Note composed:7:06 PM 11/24/2023

## 2024-04-04 RX ORDER — FLUTICASONE PROPIONATE 50 MCG
SPRAY, SUSPENSION (ML) NASAL
Qty: 48 ML | Refills: 2 | Status: SHIPPED | OUTPATIENT
Start: 2024-04-04 | End: 2024-05-16 | Stop reason: SDUPTHER

## 2024-04-04 NOTE — TELEPHONE ENCOUNTER
Refill Decision Note   Erasto Wharton  is requesting a refill authorization.  Brief Assessment and Rationale for Refill:  Approve     Medication Therapy Plan:         Comments:     Note composed:2:10 PM 04/04/2024             Appointments     Last Visit   10/26/2023 Clinton Card MD   Next Visit   Visit date not found Clinton Card MD

## 2024-04-04 NOTE — TELEPHONE ENCOUNTER
No care due was identified.  [facility] Embedded Care Due Messages. Reference number: [id number].   4/04/2024 1:20:22 PM CDT

## 2024-05-15 PROBLEM — S91.132A: Status: ACTIVE | Noted: 2024-05-15

## 2024-05-15 PROBLEM — M79.672 FOOT PAIN, LEFT: Status: ACTIVE | Noted: 2024-05-15

## 2024-05-15 PROBLEM — R52 PAIN: Status: ACTIVE | Noted: 2024-05-15

## 2024-05-16 DIAGNOSIS — J45.50 SEVERE PERSISTENT ASTHMA WITHOUT COMPLICATION: ICD-10-CM

## 2024-05-16 DIAGNOSIS — K21.9 GASTROESOPHAGEAL REFLUX DISEASE WITHOUT ESOPHAGITIS: ICD-10-CM

## 2024-05-17 RX ORDER — MOMETASONE FUROATE AND FORMOTEROL FUMARATE DIHYDRATE 200; 5 UG/1; UG/1
2 AEROSOL RESPIRATORY (INHALATION) 2 TIMES DAILY
Qty: 13 G | Refills: 6 | Status: SHIPPED | OUTPATIENT
Start: 2024-05-17

## 2024-05-17 RX ORDER — MONTELUKAST SODIUM 10 MG/1
10 TABLET ORAL NIGHTLY
Qty: 30 TABLET | Refills: 6 | Status: SHIPPED | OUTPATIENT
Start: 2024-05-17

## 2024-05-17 RX ORDER — FLUTICASONE PROPIONATE 50 MCG
SPRAY, SUSPENSION (ML) NASAL
Qty: 48 ML | Refills: 1 | Status: SHIPPED | OUTPATIENT
Start: 2024-05-17

## 2024-05-17 RX ORDER — ALBUTEROL SULFATE 90 UG/1
2 AEROSOL, METERED RESPIRATORY (INHALATION) EVERY 4 HOURS PRN
Qty: 36 G | Refills: 6 | Status: SHIPPED | OUTPATIENT
Start: 2024-05-17

## 2024-05-17 NOTE — TELEPHONE ENCOUNTER
No care due was identified.  Health [facility] Embedded Care Due Messages. Reference number: [id number].   5/16/2024 10:49:37 PM CDT

## 2024-05-17 NOTE — TELEPHONE ENCOUNTER
Refill Routing Note   Medication(s) are not appropriate for processing by [facility] for the following reason(s):        Due for refill >6 months ago: Prilosec    [facility] action(s):  Defer  Approve        Medication Therapy Plan: Acute care/admission documentation reviewed. No change in therapy. Ok to approve.    Extended chart review required: Yes     Appointments  past 12m or future 3m with PCP    Date Provider   Last Visit   10/26/2023 Clinton Card MD   Next Visit   Visit date not found Clinton Card MD   ED visits in past 90 days: 1        Note composed:4:44 PM 05/17/2024

## 2024-05-18 RX ORDER — OMEPRAZOLE 40 MG/1
40 CAPSULE, DELAYED RELEASE ORAL DAILY
Qty: 90 CAPSULE | Refills: 0 | Status: SHIPPED | OUTPATIENT
Start: 2024-05-18

## 2024-05-24 ENCOUNTER — TELEPHONE (OUTPATIENT)
Dept: PULMONOLOGY | Facility: CLINIC | Age: 24
End: 2024-05-24
Payer: COMMERCIAL

## 2024-05-27 DIAGNOSIS — J82.83 EOSINOPHILIC ASTHMA: Primary | ICD-10-CM

## 2024-05-27 RX ORDER — FLUTICASONE FUROATE, UMECLIDINIUM BROMIDE AND VILANTEROL TRIFENATATE 200; 62.5; 25 UG/1; UG/1; UG/1
1 POWDER RESPIRATORY (INHALATION) DAILY
Qty: 60 EACH | Refills: 11 | Status: SHIPPED | OUTPATIENT
Start: 2024-05-27

## 2024-05-27 RX ORDER — FLUTICASONE PROPIONATE AND SALMETEROL 500; 50 UG/1; UG/1
1 POWDER RESPIRATORY (INHALATION) 2 TIMES DAILY
Qty: 60 EACH | Refills: 11 | Status: SHIPPED | OUTPATIENT
Start: 2024-05-27 | End: 2025-05-27

## (undated) DEVICE — UNDERGLOVES BIOGEL PI SIZE 8

## (undated) DEVICE — SPONGE GAUZE 16PLY 4X4

## (undated) DEVICE — DRESSING TELFA STRL 4X3 LF

## (undated) DEVICE — NDL 27G X 1 1/4

## (undated) DEVICE — SPLINT NASAL AIRWAY SEPTAL SIL

## (undated) DEVICE — DRAPE MEDIUM SHEET 40X70IN

## (undated) DEVICE — DRESSING EYE OVAL LF

## (undated) DEVICE — SYR LUER LOCK STERILE 10ML

## (undated) DEVICE — SOL SALINE STER BOTTLE 500ML

## (undated) DEVICE — ELECTRODE REM PLYHSV RETURN 9

## (undated) DEVICE — GLOVE SURG ULTRA TOUCH 8

## (undated) DEVICE — SYS LABEL CORRECT MED

## (undated) DEVICE — NDL ECLIPSE SAFETY 18GX1-1/2IN

## (undated) DEVICE — PACK HEAD & NECK

## (undated) DEVICE — SYR LUER LOCK 20ML

## (undated) DEVICE — SPONGE PATTY SURGICAL .5X3IN

## (undated) DEVICE — SUT MONOCRYL 5-0 P-3 UND 18

## (undated) DEVICE — DRESSING TRANS 2X2 TEGADERM

## (undated) DEVICE — GOWN X-LG STERILE BACK

## (undated) DEVICE — BLADE SURG #15 CARBON STEEL

## (undated) DEVICE — SCRUB 10% POVIDONE IODINE 4OZ

## (undated) DEVICE — COVER SURG LIGHT HANDLE

## (undated) DEVICE — BLANKET FULL BODY 85.8X50IN

## (undated) DEVICE — SUT 4-0 CHROMIC GUT / P-3